# Patient Record
Sex: FEMALE | Race: WHITE | NOT HISPANIC OR LATINO | Employment: STUDENT | URBAN - METROPOLITAN AREA
[De-identification: names, ages, dates, MRNs, and addresses within clinical notes are randomized per-mention and may not be internally consistent; named-entity substitution may affect disease eponyms.]

---

## 2023-04-24 LAB
LEFT EYE DM RETINOPATHY: NEGATIVE
RIGHT EYE DM RETINOPATHY: NEGATIVE

## 2023-08-17 ENCOUNTER — TELEPHONE (OUTPATIENT)
Dept: DIABETES | Facility: CLINIC | Age: 33
End: 2023-08-17
Payer: COMMERCIAL

## 2023-08-17 NOTE — TELEPHONE ENCOUNTER
Pt called regarding appt scheduling, pt informed that appts are scheduled through referrals and to request one  from pcp. Pt voiced understanding.  ----- Message from Pat Winslow sent at 8/17/2023  1:09 PM CDT -----  Contact: Leni melony [phone]  [email or web address]     Patient is calling for Medical Advice regarding:    How long has patient had these symptoms:    Pharmacy name and phone#:    Would like response via Highlighthart:call back    Comments: Pt is requesting a call back from the nurse to get an appt

## 2023-10-03 ENCOUNTER — PATIENT MESSAGE (OUTPATIENT)
Dept: DIABETES | Facility: CLINIC | Age: 33
End: 2023-10-03
Payer: COMMERCIAL

## 2023-11-28 ENCOUNTER — TELEPHONE (OUTPATIENT)
Dept: DIABETES | Facility: CLINIC | Age: 33
End: 2023-11-28

## 2023-11-28 ENCOUNTER — LAB VISIT (OUTPATIENT)
Dept: LAB | Facility: HOSPITAL | Age: 33
End: 2023-11-28
Attending: NURSE PRACTITIONER
Payer: COMMERCIAL

## 2023-11-28 ENCOUNTER — OFFICE VISIT (OUTPATIENT)
Dept: DIABETES | Facility: CLINIC | Age: 33
End: 2023-11-28
Payer: COMMERCIAL

## 2023-11-28 VITALS
HEIGHT: 64 IN | HEART RATE: 93 BPM | DIASTOLIC BLOOD PRESSURE: 108 MMHG | BODY MASS INDEX: 46.79 KG/M2 | SYSTOLIC BLOOD PRESSURE: 165 MMHG | WEIGHT: 274.06 LBS

## 2023-11-28 DIAGNOSIS — Z79.4 UNCONTROLLED TYPE 2 DIABETES MELLITUS WITH HYPERGLYCEMIA, WITH LONG-TERM CURRENT USE OF INSULIN: ICD-10-CM

## 2023-11-28 DIAGNOSIS — E11.65 UNCONTROLLED TYPE 2 DIABETES MELLITUS WITH HYPERGLYCEMIA, WITH LONG-TERM CURRENT USE OF INSULIN: Primary | ICD-10-CM

## 2023-11-28 DIAGNOSIS — I10 PRIMARY HYPERTENSION: ICD-10-CM

## 2023-11-28 DIAGNOSIS — E11.42 DIABETIC PERIPHERAL NEUROPATHY ASSOCIATED WITH TYPE 2 DIABETES MELLITUS: ICD-10-CM

## 2023-11-28 DIAGNOSIS — N30.10 INTERSTITIAL CYSTITIS: ICD-10-CM

## 2023-11-28 DIAGNOSIS — Z79.4 UNCONTROLLED TYPE 2 DIABETES MELLITUS WITH HYPERGLYCEMIA, WITH LONG-TERM CURRENT USE OF INSULIN: Primary | ICD-10-CM

## 2023-11-28 DIAGNOSIS — R80.9 PERSISTENT MICROALBUMINURIA ASSOCIATED WITH TYPE 2 DIABETES MELLITUS: ICD-10-CM

## 2023-11-28 DIAGNOSIS — E11.65 UNCONTROLLED TYPE 2 DIABETES MELLITUS WITH HYPERGLYCEMIA, WITH LONG-TERM CURRENT USE OF INSULIN: ICD-10-CM

## 2023-11-28 DIAGNOSIS — E11.29 PERSISTENT MICROALBUMINURIA ASSOCIATED WITH TYPE 2 DIABETES MELLITUS: ICD-10-CM

## 2023-11-28 LAB
ANION GAP SERPL CALC-SCNC: 11 MMOL/L (ref 8–16)
BUN SERPL-MCNC: 9 MG/DL (ref 6–20)
C PEPTIDE SERPL-MCNC: 4.97 NG/ML (ref 0.78–5.19)
CALCIUM SERPL-MCNC: 9.9 MG/DL (ref 8.7–10.5)
CHLORIDE SERPL-SCNC: 98 MMOL/L (ref 95–110)
CHOLEST SERPL-MCNC: 247 MG/DL (ref 120–199)
CHOLEST/HDLC SERPL: 6.9 {RATIO} (ref 2–5)
CO2 SERPL-SCNC: 22 MMOL/L (ref 23–29)
CREAT SERPL-MCNC: 0.7 MG/DL (ref 0.5–1.4)
EST. GFR  (NO RACE VARIABLE): >60 ML/MIN/1.73 M^2
ESTIMATED AVG GLUCOSE: 266 MG/DL (ref 68–131)
GLUCOSE SERPL-MCNC: 251 MG/DL (ref 70–110)
GLUCOSE SERPL-MCNC: 253 MG/DL (ref 70–110)
HBA1C MFR BLD: 10.9 % (ref 4–5.6)
HDLC SERPL-MCNC: 36 MG/DL (ref 40–75)
HDLC SERPL: 14.6 % (ref 20–50)
LDLC SERPL CALC-MCNC: 152.2 MG/DL (ref 63–159)
NONHDLC SERPL-MCNC: 211 MG/DL
POTASSIUM SERPL-SCNC: 4.6 MMOL/L (ref 3.5–5.1)
SODIUM SERPL-SCNC: 131 MMOL/L (ref 136–145)
TRIGL SERPL-MCNC: 294 MG/DL (ref 30–150)
TSH SERPL DL<=0.005 MIU/L-ACNC: 1.4 UIU/ML (ref 0.4–4)

## 2023-11-28 PROCEDURE — 3080F DIAST BP >= 90 MM HG: CPT | Mod: CPTII,S$GLB,, | Performed by: NURSE PRACTITIONER

## 2023-11-28 PROCEDURE — 99204 PR OFFICE/OUTPT VISIT, NEW, LEVL IV, 45-59 MIN: ICD-10-PCS | Mod: S$GLB,,, | Performed by: NURSE PRACTITIONER

## 2023-11-28 PROCEDURE — 99999 PR PBB SHADOW E&M-EST. PATIENT-LVL V: CPT | Mod: PBBFAC,,, | Performed by: NURSE PRACTITIONER

## 2023-11-28 PROCEDURE — 86341 ISLET CELL ANTIBODY: CPT | Performed by: NURSE PRACTITIONER

## 2023-11-28 PROCEDURE — 1159F PR MEDICATION LIST DOCUMENTED IN MEDICAL RECORD: ICD-10-PCS | Mod: CPTII,S$GLB,, | Performed by: NURSE PRACTITIONER

## 2023-11-28 PROCEDURE — 99999 PR PBB SHADOW E&M-EST. PATIENT-LVL V: ICD-10-PCS | Mod: PBBFAC,,, | Performed by: NURSE PRACTITIONER

## 2023-11-28 PROCEDURE — 84681 ASSAY OF C-PEPTIDE: CPT | Performed by: NURSE PRACTITIONER

## 2023-11-28 PROCEDURE — 1159F MED LIST DOCD IN RCRD: CPT | Mod: CPTII,S$GLB,, | Performed by: NURSE PRACTITIONER

## 2023-11-28 PROCEDURE — 86337 INSULIN ANTIBODIES: CPT | Performed by: NURSE PRACTITIONER

## 2023-11-28 PROCEDURE — 3046F HEMOGLOBIN A1C LEVEL >9.0%: CPT | Mod: CPTII,S$GLB,, | Performed by: NURSE PRACTITIONER

## 2023-11-28 PROCEDURE — 83036 HEMOGLOBIN GLYCOSYLATED A1C: CPT | Performed by: NURSE PRACTITIONER

## 2023-11-28 PROCEDURE — 3080F PR MOST RECENT DIASTOLIC BLOOD PRESSURE >= 90 MM HG: ICD-10-PCS | Mod: CPTII,S$GLB,, | Performed by: NURSE PRACTITIONER

## 2023-11-28 PROCEDURE — 3077F PR MOST RECENT SYSTOLIC BLOOD PRESSURE >= 140 MM HG: ICD-10-PCS | Mod: CPTII,S$GLB,, | Performed by: NURSE PRACTITIONER

## 2023-11-28 PROCEDURE — 84443 ASSAY THYROID STIM HORMONE: CPT | Performed by: NURSE PRACTITIONER

## 2023-11-28 PROCEDURE — 3077F SYST BP >= 140 MM HG: CPT | Mod: CPTII,S$GLB,, | Performed by: NURSE PRACTITIONER

## 2023-11-28 PROCEDURE — 3008F BODY MASS INDEX DOCD: CPT | Mod: CPTII,S$GLB,, | Performed by: NURSE PRACTITIONER

## 2023-11-28 PROCEDURE — 82962 POCT GLUCOSE, HAND-HELD DEVICE: ICD-10-PCS | Mod: S$GLB,,, | Performed by: NURSE PRACTITIONER

## 2023-11-28 PROCEDURE — 82043 UR ALBUMIN QUANTITATIVE: CPT | Performed by: NURSE PRACTITIONER

## 2023-11-28 PROCEDURE — 3046F PR MOST RECENT HEMOGLOBIN A1C LEVEL > 9.0%: ICD-10-PCS | Mod: CPTII,S$GLB,, | Performed by: NURSE PRACTITIONER

## 2023-11-28 PROCEDURE — 80061 LIPID PANEL: CPT | Performed by: NURSE PRACTITIONER

## 2023-11-28 PROCEDURE — 1160F PR REVIEW ALL MEDS BY PRESCRIBER/CLIN PHARMACIST DOCUMENTED: ICD-10-PCS | Mod: CPTII,S$GLB,, | Performed by: NURSE PRACTITIONER

## 2023-11-28 PROCEDURE — 80048 BASIC METABOLIC PNL TOTAL CA: CPT | Performed by: NURSE PRACTITIONER

## 2023-11-28 PROCEDURE — 99204 OFFICE O/P NEW MOD 45 MIN: CPT | Mod: S$GLB,,, | Performed by: NURSE PRACTITIONER

## 2023-11-28 PROCEDURE — 36415 COLL VENOUS BLD VENIPUNCTURE: CPT | Performed by: NURSE PRACTITIONER

## 2023-11-28 PROCEDURE — 82962 GLUCOSE BLOOD TEST: CPT | Mod: S$GLB,,, | Performed by: NURSE PRACTITIONER

## 2023-11-28 PROCEDURE — 3008F PR BODY MASS INDEX (BMI) DOCUMENTED: ICD-10-PCS | Mod: CPTII,S$GLB,, | Performed by: NURSE PRACTITIONER

## 2023-11-28 PROCEDURE — 1160F RVW MEDS BY RX/DR IN RCRD: CPT | Mod: CPTII,S$GLB,, | Performed by: NURSE PRACTITIONER

## 2023-11-28 RX ORDER — BLOOD-GLUCOSE SENSOR
EACH MISCELLANEOUS
Qty: 2 EACH | Refills: 11 | Status: SHIPPED | OUTPATIENT
Start: 2023-11-28

## 2023-11-28 RX ORDER — TIRZEPATIDE 2.5 MG/.5ML
2.5 INJECTION, SOLUTION SUBCUTANEOUS
Qty: 4 PEN | Refills: 0 | Status: SHIPPED | OUTPATIENT
Start: 2023-11-28 | End: 2023-12-15 | Stop reason: SINTOL

## 2023-11-28 RX ORDER — INSULIN DEGLUDEC 200 U/ML
100 INJECTION, SOLUTION SUBCUTANEOUS DAILY
Qty: 6 PEN | Refills: 5 | Status: SHIPPED | OUTPATIENT
Start: 2023-11-28 | End: 2024-01-09 | Stop reason: SDUPTHER

## 2023-11-28 RX ORDER — INSULIN ASPART 100 [IU]/ML
50 INJECTION, SOLUTION INTRAVENOUS; SUBCUTANEOUS
Qty: 45 ML | Refills: 5 | Status: SHIPPED | OUTPATIENT
Start: 2023-11-28

## 2023-11-28 NOTE — Clinical Note
Can you try to obtain her eye exam for me? [facility]. Thanks! 06-Nov-2020 18:25

## 2023-11-28 NOTE — PATIENT INSTRUCTIONS
Referrals: [facility] 3 and diabetes education. Also need pre-pump assessment. Consideration for the iLet insulin pump.    Stop Basaglar twice daily.  Start Tresiba 110 units ONCE daily.     Continue Novolog 50 units before each main meal for now.     Start Mounjaro 2.5 mg once a week. Main side effects = appetite suppression/weight loss, possibly nausea, constipation/diarrhea or reflux. If the symptoms are mild, they do improve with continued use. If you develop any abdominal pain or you are not tolerating this, stop the medication and let me know.

## 2023-11-28 NOTE — PROGRESS NOTES
Subjective:         Patient ID: Leni Dumont is a 33 y.o. female.  Patient's current PCP is Brian Darby MD.     Chief Complaint: Diabetes Mellitus    HPI  Leni Dumont is a 33 y.o. White female presenting for a new consult for diabetes. Patient has been diagnosed with diabetes since age 17 years old .  The patient was initially diagnosed with Type 2 diabetes mellitus based on the following criteria:   Received diabetes education:    CURRENT DM MEDICATIONS:   Basaglar 50 units twice a day   Novolog 50 units TID AC -- out x 2 weeks     Past failed treatment include: Metformin - significant diarrhea; Trulicity - diarrhea/bladder pain; Prandin,Byetta,Glipizide- diarrhea. Lantus/Basaglar- failure to control diabetes    Blood glucose testing is performed regularly. Patient is testing 3 times per day, denies hypoglycemia. CGM - No // Interested in Kal  Meter: Did not bring today  Preferred lab: [place]     Her blood sugar in the clinic today was:   Lab Results   Component Value Date    POCGLU 253 (A) 11/28/2023     Leni Dumont presents today to discuss DM management.   Home blood sugar records: None available today    Establishing care - here with her mom. She has a history of interstitial cystitis and reports that healthy foods exacerbate her symptoms -- as a result, she eats mainly meat and carbs. No meter/logs today. She has been out of Novolog x 2 weeks. She is fasting today with glucose of 253 noted. She reports before running out of insulin, she was taking 50 units TID AC. Denies any hypoglycemia. She has tried and failed multiple medications (see above) due to GI side effects. With her current bladder complaints, I do not feel adding an SGLT2 inhibitor would be appropriate. We discussed CGM and she is agreeable to Kal referral. We briefly discussed insulin pump therapy if ultimately insulin is the only medication she tolerates - she may be a good candidate for the iLet pump. Ultimately, U500 may  "need to be considered. She is willing to try Mounjaro but I suspect this may not be tolerated- reviewed MOA and most common side effects. She has no contraindications to this drug class, including no history of pancreatitis, gastroparesis, or personal nor family history of medullary thyroid cancer.    Foot exam deferred due to time constraints. Reports eye exam UTD. Denies DR.     She reports being tested in the past for hypercortisolism with another provider with 24 hour urine studies- states negative. I do not see these records and it may be worth a re-check. She has central weight gain, thin extremities, and a prominent dorsocervical fat pad noted. Will discuss at next visit re-check.    All Bgs > 200. Has seen as high as 500.    Neuropathy- Followed by Dr. Linda.    Urologist- Dr. Roberts.    Current diet:  Mainly carbs and meats. Can eat fresh green beans and carrots. Cannot eat wheat pasta. Eats a lot of chicken, tortillas, cheese. Can only drink Dasani water.    Activity Level:     Lab Results   Component Value Date    HGBA1C 10.5 (H) 04/11/2023    HGBA1C 11.1 (H) 06/29/2022    HGBA1C 11.7 (H) 01/04/2022     Any episodes of hypoglycemia? no   Hypoglycemia Unawareness? no   Severe hypoglycemia requiring 3rd party no  Complications related to diabetes: peripheral neuropathy and microalbuminuria    STANDARDS OF CARE  Diabetes Management Status    Statin: Taking  ACE/ARB: Not taking    Screening or Prevention Patient's value Goal Complete/Controlled?   HgA1C Testing and Control   Lab Results   Component Value Date    HGBA1C 10.5 (H) 04/11/2023      Annually/Less than 8% No   Lipid profile : 06/29/2022 Annually No   LDL control No results found for: "LDLCALC" Annually/Less than 100 mg/dl  No   Nephropathy screening No results found for: "LABMICR"  Lab Results   Component Value Date    PROTEINUA Negative 09/29/2014     Lab Results   Component Value Date    UTPCR 0.05 06/11/2008      Annually No   Blood pressure BP " "Readings from Last 1 Encounters:   11/28/23 (!) 165/108    Less than 140/90 No   Dilated retinal exam Most Recent Eye Exam Date: Not Found Annually Yes- reports UTD with [facility].   Foot exam   Most Recent Foot Exam Date: Not Found Annually No Deferred-time constraints      Labs reviewed and are noted below.    Lab Results   Component Value Date    WBC 11.25 09/29/2014    HGB 13.4 09/29/2014    HCT 41.4 09/29/2014     (H) 09/29/2014    ALT 68 (H) 09/29/2014    AST 54 (H) 09/29/2014     12/11/2020    K 4.6 12/11/2020     09/29/2014    ANIONGAP 10 09/29/2014    CREATININE 0.59 12/11/2020    ESTGFRAFRICA 144 12/11/2020    EGFRNONAA >60 09/29/2014    BUN 11 12/11/2020    CO2 23 09/29/2014    TSH 1.91 04/11/2023     (H) 09/29/2014    UTPCR 0.05 06/11/2008     Lab Results   Component Value Date    TSH 1.91 04/11/2023    XPKMVEZL09 759 06/30/2023    CALCIUM 9.7 12/11/2020    PHOS 4.2 06/11/2008     No results found for: "CPEPTIDE"  No results found for: "GLUTAMICACID"  Glucose   Date Value Ref Range Status   09/29/2014 142 (H) 70 - 110 mg/dL Final     Anion Gap   Date Value Ref Range Status   09/29/2014 10 8 - 16 mmol/L Final     eGFR if    Date Value Ref Range Status   09/29/2014 >60 >60 mL/min/1.73 m^2 Final     eGFR    Date Value Ref Range Status   12/11/2020 144  Final     eGFR if non    Date Value Ref Range Status   09/29/2014 >60 >60 mL/min/1.73 m^2 Final     Comment:     Calculation used to obtain the estimated glomerular filtration  rate (eGFR) is the CKD-EPI equation. Since race is unknown   in our information system, the eGFR values for   -American and Non--American patients are given   for each creatinine result.       The following portions of the patient's history were reviewed and updated as appropriate: allergies, current medications, past family history, past medical history, past social history, past " surgical history, and problem list.    Review of patient's allergies indicates:   Allergen Reactions    Dextromethorphan-guaifenesin     Dicyclomine     Doxycycline     Erythromycin     Exefen [pseudoephedrine-guaifenesin]     Medrol [methylprednisolone]     Phenylephrine      Social History     Socioeconomic History    Marital status: Single   Tobacco Use    Smoking status: Never    Smokeless tobacco: Never   Substance and Sexual Activity    Alcohol use: No    Drug use: No    Sexual activity: Yes     Past Medical History:   Diagnosis Date    Asthma     Diabetes mellitus     Heart disease     Hyperlipidemia     Interstitial cystitis     Varicose veins of left leg with edema        REVIEW OF SYSTEMS:  Eyes:Denies h/o DR.  Cardiovascular: History of HTN and HLD.  GI: No history of pancreatitis or gastroparesis.  : Positive microalbuminuria. H/o interstitial cystitis.   Neuro: Positive neuropathy.  PSYCH: No tobacco use.  ENDO: See HPI.        Objective:      Vitals:    11/28/23 1310   BP: (!) 165/108   Pulse: 93     RESPIRATORY: No respiratory distress  NEUROLOGIC: Cranial nerves II-XII grossly intact.   PSYCHIATRIC: Alert & oriented x3. Normal mood and affect.  FOOT EXAMINATION: Deferred-time constraints  Assessment:       1. Uncontrolled type 2 diabetes mellitus with hyperglycemia, with long-term current use of insulin    2. Primary hypertension    3. Interstitial cystitis    4. Persistent microalbuminuria associated with type 2 diabetes mellitus    5. Diabetic peripheral neuropathy associated with type 2 diabetes mellitus        Plan:   Uncontrolled type 2 diabetes mellitus with hyperglycemia, with long-term current use of insulin  -     POCT Glucose, Hand-Held Device  -     FREESTYLE SWATI 3 SENSOR Jigna; Change sensor every 14 days  Dispense: 2 each; Refill: 11  -     Ambulatory referral/consult to Diabetes Education; Future; Expected date: 11/28/2023  -     Glutamic Acid Decarboxylase; Future; Expected date:  11/28/2023  -     Insulin Antibody; Future; Expected date: 11/28/2023  -     C-Peptide; Future; Expected date: 11/28/2023  -     Basic Metabolic Panel; Future; Expected date: 11/28/2023  -     Hemoglobin A1C; Future; Expected date: 11/28/2023  -     Lipid Panel; Future; Expected date: 11/28/2023  -     Microalbumin/Creatinine Ratio, Urine; Future; Expected date: 11/28/2023  -     TSH; Future; Expected date: 11/28/2023  -     insulin degludec (TRESIBA FLEXTOUCH U-200) 200 unit/mL (3 mL) insulin pen; Inject 100 Units into the skin once daily.  Dispense: 6 pen ; Refill: 5  -     tirzepatide (MOUNJARO) 2.5 mg/0.5 mL PnIj; Inject 2.5 mg into the skin every 7 days.  Dispense: 4 pen ; Refill: 0  -     NOVOLOG FLEXPEN U-100 INSULIN 100 unit/mL (3 mL) InPn pen; Inject 50 Units into the skin 3 (three) times daily with meals.  Dispense: 45 mL; Refill: 5    Referrals: [facility] 3 and diabetes education. Also need pre-pump assessment. Consideration for the iLet insulin pump. May ultimately need U500 if Mounjaro is not tolerated.     Stop Basaglar twice daily.  Start Tresiba 110 units ONCE daily.     Continue Novolog 50 units before each main meal for now.     Start Mounjaro 2.5 mg once a week. Main side effects = appetite suppression/weight loss, possibly nausea, constipation/diarrhea or reflux. If the symptoms are mild, they do improve with continued use. If you develop any abdominal pain or you are not tolerating this, stop the medication and let me know.    Primary hypertension    Interstitial cystitis    Persistent microalbuminuria associated with type 2 diabetes mellitus    Diabetic peripheral neuropathy associated with type 2 diabetes mellitus    - Follow up: 6 weeks    I spent a total of 50 minutes on the day of the visit.This includes face to face time and non-face to face time preparing to see the patient (eg, review of tests), documenting clinical information in the electronic record, independently interpreting results and  "communicating results to the patient.    Portions of this note may have been created with voice recognition software. Occasional "wrong-word" or "sound-a-like" substitutions may have occurred due to the inherent limitations of voice recognition software. Please, read the note carefully and recognize, using context, where substitutions have occurred.           Angelina Clemente,EVELINP-C, BC-ADM [facility]    "

## 2023-11-28 NOTE — LETTER
AUTHORIZATION FOR RELEASE OF   CONFIDENTIAL INFORMATION        We are seeing Leni Dumont, date of birth 1990, in the clinic at No Department Specified. Brian Darby MD is the patient's PCP. Leni Dumont has an outstanding lab/procedure at the time we reviewed her chart. In order to help keep her health information updated, she has authorized us to request the following medical record(s):        (  )  MAMMOGRAM                                      (  )  COLONOSCOPY      (  )  PAP SMEAR                                          (  )  OUTSIDE LAB RESULTS     (  )  DEXA SCAN                                          ( X )  DIABETIC EYE EXAM            (  )  FOOT EXAM                                          (  )  ENTIRE RECORD     (  )  OUTSIDE IMMUNIZATIONS                 (  )  _______________         Please fax records to [facility], [place],-[phone]     If you have any questions, please contact Lakeshia Garza           Patient Name: Leni Dumont  : 1990  Patient Phone #: [phone]

## 2023-11-29 ENCOUNTER — TELEPHONE (OUTPATIENT)
Dept: DIABETES | Facility: CLINIC | Age: 33
End: 2023-11-29
Payer: COMMERCIAL

## 2023-11-29 NOTE — TELEPHONE ENCOUNTER
Spoke with pharmacist regarding pt novolog, stated instructions put by provider is a big increase from last one. Pharmacist informed of instructions per provider for medication , pharmacist voiced understanding.  ----- Message from Yuli Mcdermott sent at 11/29/2023 12:49 PM CST -----  Regarding: Pt Advice  Contact: Jaylene/[facility] [phone]  Jaylene/[facility] Pharmacist calling regarding dosage on  NOVOLOG FLEXPEN U-100 INSULIN 100 unit/mL (3 mL) InPn pen 45 mL. Please call [phone]

## 2023-11-30 ENCOUNTER — PATIENT MESSAGE (OUTPATIENT)
Dept: DIABETES | Facility: CLINIC | Age: 33
End: 2023-11-30
Payer: COMMERCIAL

## 2023-11-30 ENCOUNTER — TELEPHONE (OUTPATIENT)
Dept: DIABETES | Facility: CLINIC | Age: 33
End: 2023-11-30
Payer: COMMERCIAL

## 2023-11-30 DIAGNOSIS — E11.69 HYPERLIPIDEMIA ASSOCIATED WITH TYPE 2 DIABETES MELLITUS: Primary | ICD-10-CM

## 2023-11-30 DIAGNOSIS — E78.5 HYPERLIPIDEMIA ASSOCIATED WITH TYPE 2 DIABETES MELLITUS: Primary | ICD-10-CM

## 2023-11-30 RX ORDER — ROSUVASTATIN CALCIUM 10 MG/1
10 TABLET, COATED ORAL DAILY
Qty: 30 TABLET | Refills: 5 | Status: SHIPPED | OUTPATIENT
Start: 2023-11-30

## 2023-11-30 NOTE — TELEPHONE ENCOUNTER
Pt was confirmed lab results verbally understand  ----- Message from Angelina Clemente NP sent at 11/30/2023  9:40 AM CST -----  A1c well above goal at 10.9%. Hoping the Mounjaro will help with her diabetes control. C-peptide is normal, confirming optimal amount of insulin production. Glucose high at 251.  Kidney function and thyroid level are normal. Cholesterol is high. Is she still taking Zocor or has she tried anything else for high cholesterol?

## 2023-11-30 NOTE — PROGRESS NOTES
A1c well above goal at 10.9%. Hoping the Mounjaro will help with her diabetes control. C-peptide is normal, confirming optimal amount of insulin production. Glucose high at 251.  Kidney function and thyroid level are normal. Cholesterol is high. Is she still taking Zocor or has she tried anything else for high cholesterol?

## 2023-12-01 LAB — GAD65 AB SER-SCNC: 0 NMOL/L

## 2023-12-02 LAB — INSULIN AB SER-SCNC: 0 NMOL/L (ref 0–0.02)

## 2023-12-06 ENCOUNTER — CLINICAL SUPPORT (OUTPATIENT)
Dept: DIABETES | Facility: CLINIC | Age: 33
End: 2023-12-06
Payer: COMMERCIAL

## 2023-12-06 ENCOUNTER — TELEPHONE (OUTPATIENT)
Dept: DIABETES | Facility: CLINIC | Age: 33
End: 2023-12-06
Payer: COMMERCIAL

## 2023-12-06 DIAGNOSIS — Z79.4 UNCONTROLLED TYPE 2 DIABETES MELLITUS WITH HYPERGLYCEMIA, WITH LONG-TERM CURRENT USE OF INSULIN: ICD-10-CM

## 2023-12-06 DIAGNOSIS — E11.65 UNCONTROLLED TYPE 2 DIABETES MELLITUS WITH HYPERGLYCEMIA, WITH LONG-TERM CURRENT USE OF INSULIN: Primary | ICD-10-CM

## 2023-12-06 DIAGNOSIS — E11.65 UNCONTROLLED TYPE 2 DIABETES MELLITUS WITH HYPERGLYCEMIA, WITH LONG-TERM CURRENT USE OF INSULIN: ICD-10-CM

## 2023-12-06 DIAGNOSIS — Z79.4 UNCONTROLLED TYPE 2 DIABETES MELLITUS WITH HYPERGLYCEMIA, WITH LONG-TERM CURRENT USE OF INSULIN: Primary | ICD-10-CM

## 2023-12-06 LAB
ALBUMIN/CREAT UR: 354.4 UG/MG (ref 0–30)
CREAT UR-MCNC: 68 MG/DL (ref 15–325)
MICROALBUMIN UR DL<=1MG/L-MCNC: 241 UG/ML

## 2023-12-06 PROCEDURE — G0108 DIAB MANAGE TRN  PER INDIV: HCPCS | Mod: S$GLB,,, | Performed by: DIETITIAN, REGISTERED

## 2023-12-06 PROCEDURE — G0108 PR DIAB MANAGE TRN  PER INDIV: ICD-10-PCS | Mod: S$GLB,,, | Performed by: DIETITIAN, REGISTERED

## 2023-12-06 PROCEDURE — 99999 PR PBB SHADOW E&M-EST. PATIENT-LVL III: ICD-10-PCS | Mod: PBBFAC,,, | Performed by: DIETITIAN, REGISTERED

## 2023-12-06 PROCEDURE — 99999 PR PBB SHADOW E&M-EST. PATIENT-LVL III: CPT | Mod: PBBFAC,,, | Performed by: DIETITIAN, REGISTERED

## 2023-12-07 ENCOUNTER — TELEPHONE (OUTPATIENT)
Dept: DIABETES | Facility: CLINIC | Age: 33
End: 2023-12-07
Payer: COMMERCIAL

## 2023-12-07 ENCOUNTER — PATIENT MESSAGE (OUTPATIENT)
Dept: DIABETES | Facility: CLINIC | Age: 33
End: 2023-12-07
Payer: COMMERCIAL

## 2023-12-07 VITALS — WEIGHT: 278 LBS | BODY MASS INDEX: 47.72 KG/M2

## 2023-12-07 DIAGNOSIS — E11.65 UNCONTROLLED TYPE 2 DIABETES MELLITUS WITH HYPERGLYCEMIA, WITH LONG-TERM CURRENT USE OF INSULIN: Primary | ICD-10-CM

## 2023-12-07 DIAGNOSIS — Z79.4 UNCONTROLLED TYPE 2 DIABETES MELLITUS WITH HYPERGLYCEMIA, WITH LONG-TERM CURRENT USE OF INSULIN: Primary | ICD-10-CM

## 2023-12-07 NOTE — PROGRESS NOTES
12/14/23 - Dexcom report reviewed, saved media.    Diabetes Care Specialist Progress Note  Author: Maricruz Beckford RD, CDE  Date: 12/7/2023    Program Intake  Reason for Diabetes Program Visit:: Initial Diabetes Assessment (DM referral 11/25/23 Angelina Clemente, NP)  Type of Intervention:: Individual  Education: Self-Management Skill Review  Device Training: Personal CGM (Kal 3 training)  Current diabetes risk level:: moderate  In the last 12 months, have you:: none    Lab Results   Component Value Date    HGBA1C 10.9 (H) 11/28/2023     Clinical    Weight: 126.1 kg (278 lb)       Body mass index is 47.72 kg/m².    Patient Health Rating  Compared to other people your age, how would you rate your health?: Poor    Problem Review  Active comorbidities affecting diabetes self-care.: yes (HTN, HLD, Neuropathy, Microalbuminuria, Heart disease, Interstitial cystitis; noted diabetes since 16yo.)    Clinical Assessment  Current Diabetes Treatment:  (Mounjaro 2.5 mg weekly. Tresiba - pt dosing 100units daily. Novolog ac 50units. Noted provider Reggie considering iLet pump.)  Have you ever experienced hypoglycemia (low blood sugar)?: yes  In the last month, how often have you experienced low blood sugar?: more than once a day  Are you able to tell when your blood sugar is low?: Yes  What symptoms do you experience?: Dizzy/Light-headed, Shaky (blurred vision)  Have you ever been hospitalized because your blood sugar was too low?: no  How do you treat hypoglycemia (low blood sugar)?: other (eggnog)  Have you ever experienced hyperglycemia (high blood sugar)?: yes  In the last month, how often have you experienced high blood sugar?:  (improving since started Tresiba)  Are you able to tell when your blood sugar is high?: Yes  What are your symptoms?: frequent urination  Have you ever been hospitalized because your blood sugar was high?: no    Medication Information  How many days a week do you miss your medications?:  Never  Do you sometimes have difficulty refilling your medications?: No  Medication adherence impacting ability to self-manage diabetes?: No    Labs  Do you have regular lab work to monitor your medications?: Yes  Type of Regular Lab Work: A1c, Cholesterol (11/28/23 14:14  C-Peptide: 4.97)  Where do you get your labs drawn?: [facility]  Lab Compliance Barriers: No    Nutritional Status  Diet: Regular (Pt reports 2-3 meals w/ snacks daily. Family limits fried foods. Sugary snacks used in small portions.)  Change in appetite?: No  Recent Changes in Weight: No Recent Weight Change  Current nutritional status an area of need that is impacting patient's ability to self-manage diabetes?: No    Additional Social History    Support  Does anyone support you with your diabetes care?: yes  Who supports you?: parent, family member, self  Is Support an area impacting ability to self-manage diabetes?: No    Access to Mass Media & Technology  Does the patient have access to any of the following devices or technologies?: Smart phone, Internet Access  Media or technology needs impacting ability to self-manage diabetes?: No    Cognitive/Behavioral Health  Alert and Oriented: Yes  Difficulty Thinking: No  Requires Prompting: No  Requires assistance for routine expression?: No  Cognitive or behavioral barriers impacting ability to self-manage diabetes?: No    Culture/Rastafari  Culture or Baptist beliefs that may impact ability to access healthcare: No    Communication  Language preference: English  Hearing Problems: No  Vision Problems: Yes  Vision problem type:: Decreased Vision  Vision Assistance: Glasses  Communication needs impacting ability to self-manage diabetes?: No    Health Literacy  Preferred Learning Method: Demonstration, Hands On, Video  How often do you need to have someone help you read instructions, pamphlets, or written material from your doctor or pharmacy?: Never  Health literacy needs impacting ability to  self-manage diabetes?: No      Diabetes Self-Management Skills Assessment    Diabetes Disease Process/Treatment Options  Diabetes Disease Process/Treatment Options: Skills Assessment Completed: No  Deferred due to:: Time  Area of need?: Deferred    Nutrition/Healthy Eating  Challenges to healthy eating::  (Limited food tolerance due interstitial cystitis.)  Method of carbohydrate measurement:: eyeballing/guessing  Patient can identify foods that impact blood sugar.: yes  Patient-identified foods:: starches (bread, pasta, rice, cereal), sweets  Nutrition/Healthy Eating Skills Assessment Completed:: Yes  Assessment indicates:: Knowledge deficit, Instruction Needed  Area of need?: Yes    Physical Activity/Exercise  Physical Activity/Exercise Skills Assessment Completed: : No  Deffered due to:: Time  Area of need?: Deferred    Medications  Patient is able to describe current diabetes management routine.: yes  Diabetes management routine:: insulin, injectable medications  Patient is able to identify current diabetes medications, dosages, and appropriate timing of medications.: yes  Patient understands the purpose of the medications taken for diabetes.: yes  Patient reports problems or concerns with current medication regimen.: no  Medication Skills Assessment Completed:: Yes  Assessment indicates:: Adequate understanding  Area of need?: No    Home Blood Glucose Monitoring  Patient states that blood sugar is checked at home daily.: yes  Monitoring Method:: home glucometer, personal continuous glucose monitor  Home glucometer meter type:: unavailable  When you check what is your typical blood sugar range? : per recall, fst -180, ac 130-170, bed 104-180  Blood glucose logs:: no, encouraged to bring logs to provider visits  Personal CGM type:: Pt [facility] for Kal 3 training.  Home Blood Glucose Monitoring Skills Assessment Completed: : Yes  Assessment indicates:: Knowledge deficit, Instruction Needed  Area of  need?: Yes    Acute Complications  Patient is able to identify types of acute complications: Yes  Patient Identified:: Hypoglycemia, Hyperglycemia  Patient is able to state the basic meaning of hypoglycemia?: Yes  Able to state the blood sugar range for hypoglycemia?: yes  Patient stated range:: Pt reports symptoms <100.  Patient can identify general symptoms of hypoglycemia: yes  Patient identified:: shakiness, dizziness  Patient identified:: other (see comments) (recently using eggnog)  Patient is able to state the basic meaning of hyperglycemia?: Yes  Able to state the blood sugar range for hyperglycemia?: yes  Patient stated range:: 200s  Patient able to state proper treatment of hyperglycemia?: yes  Patient identified:: monitor blood sugar, take medication as recommended, increase water intake  Acute Complications Skills Assessment Completed: : Yes  Assessment indicates:: Instruction Needed  Area of need?: Yes    Chronic Complications  Chronic Complications Skills Assessment Completed: : No  Deferred due to:: Time  Area of need?: Deferred    Psychosocial/Coping  Patient can identify ways of coping with chronic disease.: yes  Patient-stated ways of coping with chronic disease:: spiritual/Anglican practices, support from loved ones (crafts, cooking)  Psychosocial/Coping Skills Assessment Completed: : Yes  Assessment indicates:: Adequate understanding  Area of need?: No    Assessment Summary and Plan  Based on today's diabetes care assessment, the following areas of need were identified:          12/6/2023    12:01 AM   Social   Support No   Access to Mass Media/Tech No   Cognitive/Behavioral Health No   Culture/Oriental orthodox No   Communication No   Health Literacy No          12/6/2023    12:01 AM   Clinical   Medication Adherence No   Lab Compliance No   Nutritional Status No          12/6/2023    12:01 AM   Diabetes Self-Management Skills   Diabetes Disease Process/Treatment Options Deferred   Nutrition/Healthy  Eating Yes - see care plan   Physical Activity/Exercise Deferred   Medication No   Home Blood Glucose Monitoring Yes - see care plan   Acute Complications Yes - see care plan   Discussed prevention, identification signs/symptoms and treatment of hyperglycemia and hypoglycemia and when to contact clinic.    Chronic Complications Deferred   Psychosocial/Coping No         Today's interventions were provided through individual discussion, instruction, and written materials were provided.    Patient verbalized understanding of instruction and written materials.  Pt was able to return back demonstration of instructions today. Patient understood key points, needs reinforcement and further instruction.     Diabetes Self-Management Care Plan:  Today's Diabetes Self-Management Care Plan was developed with Leni's input. Leni has agreed to work toward the following goal(s) to improve his/her overall diabetes control.      Care Plan: Diabetes Management   Updates made since 11/7/2023 12:00 AM        Problem: Blood Glucose Self-Monitoring         Goal: Patient agrees to use Freestyle Kal 3 and backup meter as directed.    Start Date: 12/6/2023   Expected End Date: 11/25/2024   Priority: High   Barriers: No Barriers Identified   Note:    FREESTYLE KAL 3 CGM TRAINING  Education provided per [employer] Freestyle Kal 3 protocol, quick start guide and videos. Assisted with mobile earl download and [facility] clinic data share.       The FreeStyle Kal 3 Continuous Glucose Monitoring System is a real time continuous glucose monitoring (CGM) device with alarms capability indicated for the management of diabetes in persons age 4 and older.   Use your blood glucose meter to make diabetes treatment decisions when you see the symbol during the first 12 hours of wearing a Sensor, if your Sensor glucose reading does not match how you feel, or if the reading does not include a number.  For you to receive alarms, they must be on and your  [place] should be within 33 feet of you at all times.   To prevent missed alarms, make sure the [place] has sufficient charge and that sound and/or vibration are turned on. Do not force close reader kelvin.   Remove the Sensor before MRI, CT scan, X-ray, or diathermy treatment.    Alarms:   Urgent Low Glucose below 55: default ON   Low Glucose: 100 mg/dl - pt selected   High Glucose 220 mg/dl  Signal Loss: ON  Reminders: Pt selected to add meal time reminders     Only apply the Sensor to the back of the upper arm. If placed in other areas, the Sensor may not function properly. Avoid areas with scars, moles, stretch marks, or lumps. Select an area of skin that generally stays flat during normal daily activities (no bending or folding). Choose a site that is at least 1 inch away from an insulin injection site. Rotate sensor sites.   Sensor can be worn for up to 14 days. The Sensor is water-resistant in up to 3 feet (1 meter) of water. Do not immerse longer than 30 minutes.  Scan sensor to start sensor session. Sensor warm-up 60min and then automatically sends a new glucose reading to your Kelvin every minute.  Instructed on how to understand SG graph, trend arrows. Reviewed menu options. Discussed how to remove and discard sensor.   Differences in glucose readings between interstitial fluid and capillary blood may be observed during times of rapid change in blood glucose, such as after eating, dosing insulin, or exercising.  Taking ascorbic acid (vitamin C) supplements while wearing the Sensor may falsely raise Sensor glucose readings. Taking more than 500 mg of ascorbic acid per day may affect the Sensor readings which could cause you to miss a severe low glucose event. Ascorbic acid can be found in supplements including multivitamins. Some supplements, including cold remedies such as Airborne® and Emergen-C®, may contain high doses of 1000 mg of ascorbic acid and should not be taken while using the Sensor. See your health  care professional to understand how long ascorbic acid is active in your body.  Contact Customer Service if you receive a Replace Sensor message before the end of the 14 day wear period. Customer Service is available at [phone] 7 Days a Week from 8AM to 8PM Eastern Standard Time.    Pt verbalized understanding of all instruction and able to demonstrate sensor application technique per protocol.        Problem: Medications         Goal: Patient Agrees to take Diabetes Medication(s) as prescribed.    Start Date: 12/6/2023   Expected End Date: 11/25/2024   Priority: Low   Barriers: No Barriers Identified        Task: Reviewed with patient all current diabetes medications and provided basic review of the purpose, dosage, frequency, side effects, and storage of both oral and injectable diabetes medications. Completed 12/7/2023        Task: Discussed guidelines for preventing, detecting and treating hypoglycemia and hyperglycemia and reviewed the importance of meal and medication timing with diabetes mediations for prevention of hypoglycemia and maximum drug benefit. Completed 12/7/2023        Problem: Healthy Eating         Goal: Eat 3 meals daily - manage carb 30-45grams/meal, 5-15grams/snack.    Start Date: 12/6/2023   Expected End Date: 11/25/2024   Priority: Medium   Barriers: No Barriers Identified        Task: Reviewed the sources and role of Carbohydrate, Protein, and Fat and how each nutrient impacts blood sugar. Completed 12/7/2023        Task: Review the importance of balancing carbohydrates with each meal using portion control techniques to count servings of carbohydrate and label reading to identify serving size and amount of total carbs per serving. Completed 12/7/2023        Task: Provided Sample plate method and reviewed the use of the plate to estimate amounts of carbohydrate per meal. Completed 12/7/2023          Follow Up Plan   Follow up in about 2 weeks (around 12/20/2023).  -review albaro  reports  -eval goals    Today's care plan and follow up schedule was discussed with patient.  Leni verbalized understanding of the care plan, goals, and agrees to follow up plan.        The patient was encouraged to communicate with his/her health care provider/physician and care team regarding his/her condition(s) and treatment.  I provided the patient with my contact information today and encouraged to contact me via phone or [facility] Patient Portal as needed.     Length of Visit   Total Time: 90 Minutes

## 2023-12-11 ENCOUNTER — TELEPHONE (OUTPATIENT)
Dept: DIABETES | Facility: CLINIC | Age: 33
End: 2023-12-11
Payer: COMMERCIAL

## 2023-12-15 RX ORDER — SEMAGLUTIDE 0.68 MG/ML
0.25 INJECTION, SOLUTION SUBCUTANEOUS
Qty: 3 ML | Refills: 5 | Status: SHIPPED | OUTPATIENT
Start: 2023-12-15 | End: 2023-12-27 | Stop reason: SINTOL

## 2023-12-16 ENCOUNTER — PATIENT MESSAGE (OUTPATIENT)
Dept: DIABETES | Facility: CLINIC | Age: 33
End: 2023-12-16
Payer: COMMERCIAL

## 2023-12-17 ENCOUNTER — PATIENT MESSAGE (OUTPATIENT)
Dept: DIABETES | Facility: CLINIC | Age: 33
End: 2023-12-17
Payer: COMMERCIAL

## 2023-12-18 ENCOUNTER — PATIENT MESSAGE (OUTPATIENT)
Dept: DIABETES | Facility: CLINIC | Age: 33
End: 2023-12-18
Payer: COMMERCIAL

## 2023-12-19 ENCOUNTER — CLINICAL SUPPORT (OUTPATIENT)
Dept: DIABETES | Facility: CLINIC | Age: 33
End: 2023-12-19
Payer: COMMERCIAL

## 2023-12-19 DIAGNOSIS — E11.65 UNCONTROLLED TYPE 2 DIABETES MELLITUS WITH HYPERGLYCEMIA, WITH LONG-TERM CURRENT USE OF INSULIN: Primary | ICD-10-CM

## 2023-12-19 DIAGNOSIS — Z79.4 UNCONTROLLED TYPE 2 DIABETES MELLITUS WITH HYPERGLYCEMIA, WITH LONG-TERM CURRENT USE OF INSULIN: Primary | ICD-10-CM

## 2023-12-19 PROCEDURE — G0108 DIAB MANAGE TRN  PER INDIV: HCPCS | Mod: 95,,, | Performed by: DIETITIAN, REGISTERED

## 2023-12-19 PROCEDURE — G0108 PR DIAB MANAGE TRN  PER INDIV: ICD-10-PCS | Mod: 95,,, | Performed by: DIETITIAN, REGISTERED

## 2023-12-19 NOTE — PROGRESS NOTES
Diabetes Care Specialist Virtual Visit Note       The patient location is: home in [place]  The chief complaint leading to consultation is: Diabetes  Visit type: audiovisual  Total time spent with patient: 30 min   Each patient to whom he or she provides medical services by telemedicine is:  (1) informed of the relationship between the physician and patient and the respective role of any other health care provider with respect to management of the patient; and (2) notified that he or she may decline to receive medical services by telemedicine and may withdraw from such care at any time.      Diabetes Care Specialist Follow-up Note  Author: Maricruz Beckford RD, CDE  Date: 12/19/2023    Program Intake  Reason for Diabetes Program Visit:: Intervention (DM referral 11/25/23 Angelina Clemente NP)  Type of Intervention:: Individual  Education: Self-Management Skill Review  Device Training: Personal CGM (Kal 3 training follow-up)  Current diabetes risk level:: moderate  In the last 12 months, have you:: used emergency room services  Was the ER or hospital admission related to diabetes?: No    Lab Results   Component Value Date    HGBA1C 10.9 (H) 11/28/2023     Clinical  Problem Review  Active comorbidities affecting diabetes self-care.:  (HTN, HLD, Neuropathy, Microalbuminuria, Heart disease, Interstitial cystitis; noted diabetes since 16yo.)    Clinical Assessment  Current Diabetes Treatment:  (Mounjaro 2.5 mg weekly -noted pt switching to Ozempic. Tresiba - pt dosing 100units daily. Novolog ac 50-55units. Noted provider Reggie considering iLet pump.)  Have you ever experienced hypoglycemia (low blood sugar)?: yes  In the last month, how often have you experienced low blood sugar?: more than once a week (pt reports symptoms w/ BG 80s)  Are you able to tell when your blood sugar is low?: Yes (Kal alert)  What symptoms do you experience?: Dizzy/Light-headed, Shaky  How do you treat hypoglycemia (low blood sugar)?: 5-6  pieces of hard candy, 1/2 can soda/fruit juice  Have you ever experienced hyperglycemia (high blood sugar)?: no (pt denies symptoms)    Medication Information  How many days a week do you miss your medications?: Never  Do you sometimes have difficulty refilling your medications?: No  Medication adherence impacting ability to self-manage diabetes?: No    Nutritional Status  Meal Plan 24 Hour Recall - Breakfast: none  Meal Plan 24 Hour Recall - Lunch: Eve mac/cheese, few crackers, water 5 OR chix nuggets  Meal Plan 24 Hour Recall - Dinner: Cheese slices on 12 crackers, wheat pasta 2/3 c w/ margarine, water  Change in appetite?:  (decreased due recent UTI)  Current nutritional status an area of need that is impacting patient's ability to self-manage diabetes?: Yes    SMBG:        Diabetes Self-Management Skills Assessment     Diabetes Disease Process/Treatment Options  Diabetes Disease Process/Treatment Options: Skills Assessment Completed: No  Deferred due to:: Time  Area of need?: Deferred    Physical Activity/Exercise  Physical Activity/Exercise Skills Assessment Completed: : No  Deffered due to:: Time  Area of need?: Deferred    Chronic Complications  Chronic Complications Skills Assessment Completed: : No  Deferred due to:: Time  Area of need?: Deferred    During today's follow-up visit,  the following areas required further assessment and content was provided/reviewed.  Based on today's diabetes care assessment, the following areas of need were identified:        12/19/2023    12:01 AM   Clinical   Medication Adherence No   Nutritional Status Yes -see care plan          12/19/2023    12:01 AM   Diabetes Self-Management Skills   Diabetes Disease Process/Treatment Options Deferred   Physical Activity/Exercise Deferred   Chronic Complications Deferred      Today's interventions were provided through individual discussion, instruction, and written materials were provided.    Patient verbalized understanding of  instruction and written materials.  Pt was able to return back demonstration of instructions today. Patient understood key points, needs reinforcement and further instruction.     Diabetes Self-Management Care Plan Review and Evaluation of Progress:  During today's follow-up Leni's Diabetes Self-Management Care Plan progress was reviewed and progress was evaluated including his/her input. Leni has agreed to continue his/her journey to improve/maintain overall diabetes control by continuing to set health goals. See care plan progress below.      Care Plan: Diabetes Management   Updates made since 11/19/2023 12:00 AM        Problem: Blood Glucose Self-Monitoring         Goal: Patient agrees to use Freestyle Kal 3 and backup meter as directed.    Start Date: 12/6/2023   Expected End Date: 11/25/2024   This Visit's Progress: On track   Priority: High   Barriers: No Barriers Identified   Note:    Reviewed reports w/ pt and encouraged overall progress. Discussed tips to assist adhesion including Tegaderm and Skintac. Reviewed process of reporting failed sensors to Freestyle.      Problem: Medications         Goal: Patient Agrees to take Diabetes Medication(s) as prescribed.    Start Date: 12/6/2023   Expected End Date: 11/25/2024   This Visit's Progress: On track   Priority: Low   Barriers: No Barriers Identified   Note:    Encouraged fu w/ provider Reggie for medical mgmt. Noted from provider mychart message notes, pt was to lower bolus 30units; however, pt's dosing 50-55units since BG readings recently elevated.      Problem: Healthy Eating         Goal: Eat 3 meals daily - manage carb 30-45grams/meal, 5-15grams/snack.    Start Date: 12/6/2023   Expected End Date: 11/25/2024   This Visit's Progress: On track   Priority: Medium   Barriers: No Barriers Identified   Note:    Encouraged maintaining consistent amount carbs per meal and carb/pro balance to help prevent hypoglycemia. Discussed role, application   shake w/ brands to try. Encouraged adequate hydration.        Follow Up Plan   Follow up in about 2 months (around 2/19/2024).  -review Kal report  -eval goals    Today's care plan and follow up schedule was discussed with patient.  Leni verbalized understanding of the care plan, goals, and agrees to follow up plan.        The patient was encouraged to communicate with his/her health care provider/physician and care team regarding his/her condition(s) and treatment.  I provided the patient with my contact information today and encouraged to contact me via phone or [facility]'s Patient Portal as needed.     Length of Visit   Total Time: 30 Minutes

## 2023-12-27 ENCOUNTER — PATIENT MESSAGE (OUTPATIENT)
Dept: DIABETES | Facility: CLINIC | Age: 33
End: 2023-12-27
Payer: COMMERCIAL

## 2023-12-27 DIAGNOSIS — E11.65 UNCONTROLLED TYPE 2 DIABETES MELLITUS WITH HYPERGLYCEMIA, WITH LONG-TERM CURRENT USE OF INSULIN: Primary | ICD-10-CM

## 2023-12-27 DIAGNOSIS — Z79.4 UNCONTROLLED TYPE 2 DIABETES MELLITUS WITH HYPERGLYCEMIA, WITH LONG-TERM CURRENT USE OF INSULIN: Primary | ICD-10-CM

## 2023-12-27 DIAGNOSIS — R11.0 NAUSEA: ICD-10-CM

## 2023-12-27 RX ORDER — TIRZEPATIDE 2.5 MG/.5ML
2.5 INJECTION, SOLUTION SUBCUTANEOUS
Qty: 4 PEN | Refills: 0 | Status: SHIPPED | OUTPATIENT
Start: 2023-12-27 | End: 2024-01-09 | Stop reason: SDUPTHER

## 2023-12-27 RX ORDER — ONDANSETRON 4 MG/1
4 TABLET, ORALLY DISINTEGRATING ORAL EVERY 8 HOURS PRN
Qty: 30 TABLET | Refills: 0 | Status: SHIPPED | OUTPATIENT
Start: 2023-12-27 | End: 2024-01-09 | Stop reason: SDUPTHER

## 2024-01-09 ENCOUNTER — OFFICE VISIT (OUTPATIENT)
Dept: DIABETES | Facility: CLINIC | Age: 34
End: 2024-01-09
Payer: COMMERCIAL

## 2024-01-09 VITALS
SYSTOLIC BLOOD PRESSURE: 142 MMHG | BODY MASS INDEX: 47.24 KG/M2 | HEART RATE: 88 BPM | DIASTOLIC BLOOD PRESSURE: 100 MMHG | HEIGHT: 64 IN | WEIGHT: 276.69 LBS

## 2024-01-09 DIAGNOSIS — N30.10 INTERSTITIAL CYSTITIS: ICD-10-CM

## 2024-01-09 DIAGNOSIS — E78.5 HYPERLIPIDEMIA ASSOCIATED WITH TYPE 2 DIABETES MELLITUS: ICD-10-CM

## 2024-01-09 DIAGNOSIS — E11.29 PERSISTENT MICROALBUMINURIA ASSOCIATED WITH TYPE 2 DIABETES MELLITUS: ICD-10-CM

## 2024-01-09 DIAGNOSIS — E11.69 HYPERLIPIDEMIA ASSOCIATED WITH TYPE 2 DIABETES MELLITUS: ICD-10-CM

## 2024-01-09 DIAGNOSIS — R80.9 PERSISTENT MICROALBUMINURIA ASSOCIATED WITH TYPE 2 DIABETES MELLITUS: ICD-10-CM

## 2024-01-09 DIAGNOSIS — R11.0 NAUSEA: ICD-10-CM

## 2024-01-09 DIAGNOSIS — Z79.4 UNCONTROLLED TYPE 2 DIABETES MELLITUS WITH HYPERGLYCEMIA, WITH LONG-TERM CURRENT USE OF INSULIN: Primary | ICD-10-CM

## 2024-01-09 DIAGNOSIS — E11.65 UNCONTROLLED TYPE 2 DIABETES MELLITUS WITH HYPERGLYCEMIA, WITH LONG-TERM CURRENT USE OF INSULIN: Primary | ICD-10-CM

## 2024-01-09 DIAGNOSIS — I10 PRIMARY HYPERTENSION: ICD-10-CM

## 2024-01-09 DIAGNOSIS — E11.42 DIABETIC PERIPHERAL NEUROPATHY ASSOCIATED WITH TYPE 2 DIABETES MELLITUS: ICD-10-CM

## 2024-01-09 LAB — GLUCOSE SERPL-MCNC: 116 MG/DL (ref 70–110)

## 2024-01-09 PROCEDURE — 95251 CONT GLUC MNTR ANALYSIS I&R: CPT | Mod: S$GLB,,, | Performed by: NURSE PRACTITIONER

## 2024-01-09 PROCEDURE — 3008F BODY MASS INDEX DOCD: CPT | Mod: CPTII,S$GLB,, | Performed by: NURSE PRACTITIONER

## 2024-01-09 PROCEDURE — 82962 GLUCOSE BLOOD TEST: CPT | Mod: S$GLB,,, | Performed by: NURSE PRACTITIONER

## 2024-01-09 PROCEDURE — 99999 PR PBB SHADOW E&M-EST. PATIENT-LVL V: CPT | Mod: PBBFAC,,, | Performed by: NURSE PRACTITIONER

## 2024-01-09 PROCEDURE — 1160F RVW MEDS BY RX/DR IN RCRD: CPT | Mod: CPTII,S$GLB,, | Performed by: NURSE PRACTITIONER

## 2024-01-09 PROCEDURE — 99214 OFFICE O/P EST MOD 30 MIN: CPT | Mod: S$GLB,,, | Performed by: NURSE PRACTITIONER

## 2024-01-09 PROCEDURE — 3080F DIAST BP >= 90 MM HG: CPT | Mod: CPTII,S$GLB,, | Performed by: NURSE PRACTITIONER

## 2024-01-09 PROCEDURE — 1159F MED LIST DOCD IN RCRD: CPT | Mod: CPTII,S$GLB,, | Performed by: NURSE PRACTITIONER

## 2024-01-09 PROCEDURE — 3077F SYST BP >= 140 MM HG: CPT | Mod: CPTII,S$GLB,, | Performed by: NURSE PRACTITIONER

## 2024-01-09 RX ORDER — INSULIN DEGLUDEC 200 U/ML
120 INJECTION, SOLUTION SUBCUTANEOUS DAILY
Qty: 6 PEN | Refills: 5 | Status: SHIPPED | OUTPATIENT
Start: 2024-01-09 | End: 2024-01-18 | Stop reason: SDUPTHER

## 2024-01-09 RX ORDER — TIRZEPATIDE 2.5 MG/.5ML
2.5 INJECTION, SOLUTION SUBCUTANEOUS
Qty: 4 PEN | Refills: 5 | Status: SHIPPED | OUTPATIENT
Start: 2024-01-09

## 2024-01-09 RX ORDER — ONDANSETRON 4 MG/1
4 TABLET, ORALLY DISINTEGRATING ORAL EVERY 8 HOURS PRN
Qty: 30 TABLET | Refills: 2 | Status: SHIPPED | OUTPATIENT
Start: 2024-01-09

## 2024-01-09 NOTE — PATIENT INSTRUCTIONS
Continue Tresiba 100 units ONCE daily.     Continue Novolog 50 units before each main meal for now.     Continue Mounjaro 2.5 mg once a week. Main side effects = appetite suppression/weight loss, possibly nausea, constipation/diarrhea or reflux. If the symptoms are mild, they do improve with continued use. If you develop any abdominal pain or you are not tolerating this, stop the medication and let me know.

## 2024-01-09 NOTE — PROGRESS NOTES
Subjective:         Patient ID: Leni Dumont is a 34 y.o. female.  Patient's current PCP is Biran Darby MD.     Chief Complaint: Diabetes Mellitus    HPI  Leni Dumont is a 34 y.o. White female presenting for a follow up for diabetes. Patient has been diagnosed with type 2 diabetes since age 17 .  Received diabetes education: Yes-[facility], 12/2023    CURRENT DM MEDICATIONS:   Diabetes Medications               insulin degludec (TRESIBA FLEXTOUCH U-200) 200 unit/mL (3 mL) insulin pen Inject 100 Units into the skin once daily.    NOVOLOG FLEXPEN U-100 INSULIN 100 unit/mL (3 mL) InPn pen Inject 50 Units into the skin 3 (three) times daily with meals.    repaglinide (PRANDIN) 2 MG tablet TAKE 1 TABLET BY MOUTH THREE TIMES DAILY BEFORE MEAL(S)    tirzepatide (MOUNJARO) 2.5 mg/0.5 mL PnIj Inject 2.5 mg into the skin every 7 days.           Past failed treatment include: Metformin - significant diarrhea; Trulicity - diarrhea/bladder pain; Prandin,Byetta,Glipizide- diarrhea. Lantus/Basaglar and Ozempic- failure to control diabetes     Blood glucose testing Continuous per Kal 3   Preferred lab: [facility]    Any episodes of hypoglycemia? 0% per Kal    Complications related to diabetes: peripheral neuropathy and microalbuminuria    Her blood sugar in the clinic today was:   Lab Results   Component Value Date    POCGLU 116 (A) 01/09/2024     Leni Dumont presents today for follow up visit to discuss diabetes management. Unfortunately Ozempic did not help diabetes control at all - she had to increase insulin doses. She is now on Mounjaro and doing much better-glycemic control is much improved. Using milk of magnesia as well as miralax and constipation is stable.     Per Kal download, for the last 14 days: Average glucose of 157 mg/dL. She is 78% in range. 21% of readings are high, with only 1% of readings >250. 0% hypoglycemia. Glucose variability of 21.9%. Estimated GMI 7.1%. Glycemic control is now stable. There  is nothing consistent enough to warrant adjustment. Based on review, meds to be continued as current.      Neuropathy- Followed by Dr. Linda.     Urologist- Dr. Roberts.     Current diet: Limited: Mainly carbs and meats. Can eat fresh green beans and carrots. Cannot eat wheat pasta. Eats a lot of chicken, tortillas, cheese. Can only drink Dasani water.     Activity Level:     Lab Results   Component Value Date    HGBA1C 10.9 (H) 11/28/2023    HGBA1C 10.5 (H) 04/11/2023    HGBA1C 11.1 (H) 06/29/2022     STANDARDS OF CARE  Diabetes Management Status    Statin: Taking  ACE/ARB: Not taking    Screening or Prevention Patient's value Goal Complete/Controlled?   HgA1C Testing and Control   Lab Results   Component Value Date    HGBA1C 10.9 (H) 11/28/2023      Annually/Less than 8% No   Lipid profile : 11/28/2023 Annually Yes   LDL control Lab Results   Component Value Date    LDLCALC 152.2 11/28/2023    Annually/Less than 100 mg/dl  No   Nephropathy screening Lab Results   Component Value Date    LABMICR 241.0 12/06/2023     Lab Results   Component Value Date    PROTEINUA Negative 09/29/2014     Lab Results   Component Value Date    UTPCR 0.05 06/11/2008      Annually Yes   Blood pressure BP Readings from Last 1 Encounters:   01/09/24 (!) 142/100    Less than 140/90 No   Dilated retinal exam : 04/24/2023 Annually Yes   Foot exam   Most Recent Foot Exam Date: Not Found Annually No Deferred      Labs reviewed and are noted below.    Lab Results   Component Value Date    WBC 11.25 09/29/2014    HGB 13.4 09/29/2014    HCT 41.4 09/29/2014     (H) 09/29/2014    CHOL 247 (H) 11/28/2023    TRIG 294 (H) 11/28/2023    HDL 36 (L) 11/28/2023    LDLCALC 152.2 11/28/2023    ALT 68 (H) 09/29/2014    AST 54 (H) 09/29/2014     (L) 11/28/2023    K 4.6 11/28/2023    CL 98 11/28/2023    ANIONGAP 11 11/28/2023    CREATININE 0.7 11/28/2023    ESTGFRAFRICA 144 12/11/2020    EGFRNONAA >60 09/29/2014    BUN 9 11/28/2023    CO2 22 (L)  11/28/2023    TSH 1.398 11/28/2023     (H) 11/28/2023    UTPCR 0.05 06/11/2008     Lab Results   Component Value Date    GLUTAMICACID 0.00 11/28/2023    CPEPTIDE 4.97 11/28/2023    TSH 1.398 11/28/2023    AJPZMSBT92 759 06/30/2023    CALCIUM 9.9 11/28/2023    PHOS 4.2 06/11/2008     Lab Results   Component Value Date    CPEPTIDE 4.97 11/28/2023     Lab Results   Component Value Date    GLUTAMICACID 0.00 11/28/2023     Glucose   Date Value Ref Range Status   11/28/2023 251 (H) 70 - 110 mg/dL Final     Anion Gap   Date Value Ref Range Status   11/28/2023 11 8 - 16 mmol/L Final     eGFR if    Date Value Ref Range Status   09/29/2014 >60 >60 mL/min/1.73 m^2 Final     eGFR    Date Value Ref Range Status   12/11/2020 144  Final     eGFR if non    Date Value Ref Range Status   09/29/2014 >60 >60 mL/min/1.73 m^2 Final     Comment:     Calculation used to obtain the estimated glomerular filtration  rate (eGFR) is the CKD-EPI equation. Since race is unknown   in our information system, the eGFR values for   -American and Non--American patients are given   for each creatinine result.         The following portions of the patient's history were reviewed and updated as appropriate: allergies, current medications, past family history, past medical history, past social history, past surgical history, and problem list.    Review of patient's allergies indicates:   Allergen Reactions    Dextromethorphan-guaifenesin     Dicyclomine     Doxycycline     Erythromycin     Exefen [pseudoephedrine-guaifenesin]     Medrol [methylprednisolone]     Phenylephrine      Social History     Socioeconomic History    Marital status: Single   Tobacco Use    Smoking status: Never    Smokeless tobacco: Never   Substance and Sexual Activity    Alcohol use: No    Drug use: No    Sexual activity: Yes     Past Medical History:   Diagnosis Date    Asthma     Diabetes mellitus     Heart  disease     Hyperlipidemia     Interstitial cystitis     Varicose veins of left leg with edema        REVIEW OF SYSTEMS:  Eyes No history of DR.  Cardiovascular: History of HTN and HLD.   GI: No history of pancreatitis or gastroparesis.  :H/o interstitial cystitis. Positive microalbuminuria.   SKIN: Denies rashes and lesions.  Neuro: Positive neuropathy.  PSYCH: No tobacco use.  ENDO: See HPI.        Objective:      Vitals:    01/09/24 1312   BP: (!) 142/100   Pulse: 88     RESPIRATORY: No respiratory distress  NEUROLOGIC: Cranial nerves II-XII grossly intact.   PSYCHIATRIC: Alert & oriented x3. Normal mood and affect.  FOOT EXAMINATION: Deferred  Assessment:       1. Uncontrolled type 2 diabetes mellitus with hyperglycemia, with long-term current use of insulin    2. Nausea    3. Hyperlipidemia associated with type 2 diabetes mellitus    4. Primary hypertension    5. Interstitial cystitis    6. Persistent microalbuminuria associated with type 2 diabetes mellitus    7. Diabetic peripheral neuropathy associated with type 2 diabetes mellitus        Plan:   Uncontrolled type 2 diabetes mellitus with hyperglycemia, with long-term current use of insulin  -     POCT Glucose, Hand-Held Device  -     tirzepatide (MOUNJARO) 2.5 mg/0.5 mL PnIj; Inject 2.5 mg into the skin every 7 days.  Dispense: 4 pen ; Refill: 5  -     insulin degludec (TRESIBA FLEXTOUCH U-200) 200 unit/mL (3 mL) insulin pen; Inject 120 Units into the skin once daily.  Dispense: 6 pen ; Refill: 5    Chronic -  Will have labs drawn on day of next visit.    Continue Tresiba 100 units ONCE daily.     Continue Novolog 50 units before each main meal for now.     Continue Mounjaro 2.5 mg once a week.     Continuous glucose monitoring report:    The patient's CGM was downloaded and was reviewed for the last 14 days. See HPI for interpretation & plan.    Nausea  -     ondansetron (ZOFRAN-ODT) 4 MG TbDL; Take 1 tablet (4 mg total) by mouth every 8 (eight) hours as  "needed (nausea).  Dispense: 30 tablet; Refill: 2    Hyperlipidemia associated with type 2 diabetes mellitus    Primary hypertension    Interstitial cystitis    Persistent microalbuminuria associated with type 2 diabetes mellitus    Diabetic peripheral neuropathy associated with type 2 diabetes mellitus    - Follow up: 2 months    Portions of this note may have been created with voice recognition software. Occasional "wrong-word" or "sound-a-like" substitutions may have occurred due to the inherent limitations of voice recognition software. Please, read the note carefully and recognize, using context, where substitutions have occurred.           Angelina Clemente,KIRSTINC, BC-ADM  [facility]  "

## 2024-01-18 ENCOUNTER — PATIENT MESSAGE (OUTPATIENT)
Dept: DIABETES | Facility: CLINIC | Age: 34
End: 2024-01-18
Payer: COMMERCIAL

## 2024-01-18 DIAGNOSIS — Z79.4 UNCONTROLLED TYPE 2 DIABETES MELLITUS WITH HYPERGLYCEMIA, WITH LONG-TERM CURRENT USE OF INSULIN: ICD-10-CM

## 2024-01-18 DIAGNOSIS — E11.65 UNCONTROLLED TYPE 2 DIABETES MELLITUS WITH HYPERGLYCEMIA, WITH LONG-TERM CURRENT USE OF INSULIN: ICD-10-CM

## 2024-01-18 RX ORDER — INSULIN DEGLUDEC 200 U/ML
150 INJECTION, SOLUTION SUBCUTANEOUS DAILY
Qty: 9 PEN | Refills: 5 | Status: SHIPPED | OUTPATIENT
Start: 2024-01-18 | End: 2024-01-19

## 2024-01-19 RX ORDER — INSULIN DEGLUDEC 200 U/ML
INJECTION, SOLUTION SUBCUTANEOUS
Qty: 9 PEN | Refills: 5 | Status: SHIPPED | OUTPATIENT
Start: 2024-01-19

## 2024-01-19 NOTE — TELEPHONE ENCOUNTER
Has been taken care of with the pharmacist, had to dispense as DAW1 which means Brand only. ROXANA means brand but can also dispense generic. Insurance also requires a 3 month supply, a 3 month supply was dispensed with 1 additional refill, with directions, inject 150 units daily; and it went through on insurance.    Couldn't delete the Rx request on my end

## 2024-02-22 ENCOUNTER — TELEPHONE (OUTPATIENT)
Dept: DIABETES | Facility: CLINIC | Age: 34
End: 2024-02-22
Payer: COMMERCIAL

## 2024-02-22 ENCOUNTER — CLINICAL SUPPORT (OUTPATIENT)
Dept: DIABETES | Facility: CLINIC | Age: 34
End: 2024-02-22
Payer: COMMERCIAL

## 2024-02-22 DIAGNOSIS — Z79.4 UNCONTROLLED TYPE 2 DIABETES MELLITUS WITH HYPERGLYCEMIA, WITH LONG-TERM CURRENT USE OF INSULIN: Primary | ICD-10-CM

## 2024-02-22 DIAGNOSIS — E11.65 UNCONTROLLED TYPE 2 DIABETES MELLITUS WITH HYPERGLYCEMIA, WITH LONG-TERM CURRENT USE OF INSULIN: Primary | ICD-10-CM

## 2024-02-22 PROCEDURE — G0108 DIAB MANAGE TRN  PER INDIV: HCPCS | Mod: 95,,, | Performed by: DIETITIAN, REGISTERED

## 2024-02-22 NOTE — PROGRESS NOTES
Diabetes Care Specialist Virtual Visit Note     The patient location is: home in [place]  The chief complaint leading to consultation is: Diabetes  Visit type: audiovisual  Total time spent with patient: 30 min   Each patient to whom he or she provides medical services by telemedicine is:  (1) informed of the relationship between the physician and patient and the respective role of any other health care provider with respect to management of the patient; and (2) notified that he or she may decline to receive medical services by telemedicine and may withdraw from such care at any time.    Diabetes Care Specialist Follow-up Note  Author: Maricruz Beckford RD, CDE  Date: 2/22/2024    Program Intake  Reason for Diabetes Program Visit:: Post Program Follow-Up (DM referral 11/25/23 Angelina Clemente NP)  Type of Intervention:: Individual  Education: Self-Management Skill Review  Device Training: Personal CGM (Kal 3 training follow-up)  Current diabetes risk level:: moderate  In the last 12 months, have you:: used emergency room services  Was the ER or hospital admission related to diabetes?: No  Continuous Glucose Monitoring  Patient has CGM: Yes  Personal CGM type:: Kal 3  GMI Date: 02/22/24  GMI Value: 7.2 %    Lab Results   Component Value Date    HGBA1C 10.9 (H) 11/28/2023   Pending repeat A1C.    Clinical    Problem Review  Active comorbidities affecting diabetes self-care.:  (HTN, HLD, Neuropathy, Microalbuminuria, Heart disease, Interstitial cystitis; noted diabetes since 16yo.)    Clinical Assessment  Current Diabetes Treatment:  (Mounjaro 2.5 mg weekly. Tresiba 100units daily. Novolog ac pt dosing 50units lunch, 60units dinner.)  Have you ever experienced hypoglycemia (low blood sugar)?: no (none recent)  Have you ever experienced hyperglycemia (high blood sugar)?: no (pt denies symptoms)    Nutritional Status  Diet:  (Pt reports 2meals w/ snacks daily; misses bfst. Continues limit fried foods, sugary  nuris/snacks. Excess total/sat fat, sodium from processed foods. Pt not ready to try healthier version MR entrees for fear of bladder pain.)  Meal Plan 24 Hour Recall - Breakfast: none  Meal Plan 24 Hour Recall - Lunch: Dinty Cervantes beef stew, water  Meal Plan 24 Hour Recall - Dinner: froz chix nuggets, mac/cheese  Meal Plan 24 Hour Recall - Snack: sf shortbread cookies 4 -- dining out 1x/wk; nuris: mostly water  Change in appetite?: No  Current nutritional status an area of need that is impacting patient's ability to self-manage diabetes?: Yes    Physical activity/Exercise: none structured    SMBG: Kal reports reviewed w/ pt & saved media.        During today's follow-up visit,  the following areas required further assessment and content was provided/reviewed.  Based on today's diabetes care assessment, the following areas of need were identified:          2/22/2024    12:01 AM   Clinical   Nutritional Status Yes - see care plan      Today's interventions were provided through individual discussion, instruction, and written materials were provided.    Patient verbalized understanding of instruction and written materials.  Pt was able to return back demonstration of instructions today. Patient understood key points, needs reinforcement and further instruction.     Diabetes Self-Management Care Plan Review and Evaluation of Progress:  During today's follow-up Leni's Diabetes Self-Management Care Plan progress was reviewed and progress was evaluated including his/her input. Leni has agreed to continue his/her journey to improve/maintain overall diabetes control by continuing to set health goals. See care plan progress below.      Care Plan: Diabetes Management   Updates made since 1/23/2024 12:00 AM        Problem: Blood Glucose Self-Monitoring         Goal: Patient agrees to use Freestyle Kal 3 and backup meter as directed. Completed 2/22/2024   Start Date: 12/6/2023   Expected End Date: 11/25/2024   This  Visit's Progress: Met   Recent Progress: On track   Priority: High   Barriers: No Barriers Identified   Note:    Encouraged progress and consistency of use. Reviewed tips for skin care due to irritation. Pt found new overlay tape for sensitive skin and planning to try with new sensor application. Of note, she reports irritation to Tegaderm. Instructed on how to use Flonase during site prep. Mychart msg sent with written tips. Pt verbalized understanding.          Problem: Medications         Goal: Patient Agrees to take Diabetes Medication(s) as prescribed. Completed 2/22/2024   Start Date: 12/6/2023   Expected End Date: 11/25/2024   This Visit's Progress: Met   Recent Progress: On track   Priority: Low   Barriers: No Barriers Identified   Note:    Encouraged progress & fu w/ provider Reggie for medical mgmt.        Problem: Healthy Eating         Goal: Eat 3 meals daily - manage carb 30-45grams/meal, 5-15grams/snack. Completed 2/22/2024   Start Date: 12/6/2023   Expected End Date: 11/25/2024   This Visit's Progress: Met   Recent Progress: On track   Priority: Medium   Barriers: Other (comments)   Note:    Pt is meeting goal to her best ability. Reviewed simple, healthier version MR entrees to improve quality processed foods. However, she reports concern to try new foods due fear of interstitial cystitis pain.       Follow Up Plan   Follow up if symptoms worsen or fail to improve.    Today's care plan and follow up schedule was discussed with patient.  Leni verbalized understanding of the care plan, goals, and agrees to follow up plan.        The patient was encouraged to communicate with his/her health care provider/physician and care team regarding his/her condition(s) and treatment.  I provided the patient with my contact information today and encouraged to contact me via phone or [facility] Portal as needed.     Length of Visit   Total Time: 30 Minutes

## 2024-02-22 NOTE — LETTER
February 22, 2024      Angelina lCemente, VANDANA  [address]  [place] [address]         Patient: Leni Dumont   MR Number: [id number]   YOB: 1990   Date of Visit: 2/22/2024       Dear Dr. Clemente:    Thank you for referring Leni for diabetes self-management education and support services. She has completed all components of our Diabetes Management Program. Below is a summary of her clinical outcomes and goal progress.    Patient Outcomes:    A1c Status:   Lab Results   Component Value Date    HGBA1C 10.9 (H) 11/28/2023    HGBA1C 10.5 (H) 04/11/2023    HGBA1C 11.1 (H) 06/29/2022    HGBA1C 5.6 06/11/2008       Care Plan: Diabetes Management   Updates made since 1/23/2024 12:00 AM        Problem: Blood Glucose Self-Monitoring         Goal: Patient agrees to use Freestyle Kal 3 and backup meter as directed. Completed 2/22/2024   Start Date: 12/6/2023   Expected End Date: 11/25/2024   This Visit's Progress: Met   Recent Progress: On track   Priority: High   Barriers: No Barriers Identified   Note:    Encouraged progress and consistency of use. Reviewed tips for skin care due to irritation. Pt found new overlay tape for sensitive skin and planning to try with new sensor application. Of note, she reports irritation to Tegaderm. Instructed on how to use Flonase during site prep. [facility] msg sent with written tips. Pt verbalized understanding.          Problem: Medications         Goal: Patient Agrees to take Diabetes Medication(s) as prescribed. Completed 2/22/2024   Start Date: 12/6/2023   Expected End Date: 11/25/2024   This Visit's Progress: Met   Recent Progress: On track   Priority: Low   Barriers: No Barriers Identified   Note:    Encouraged progress & fu w/ provider Reggie for medical mgmt.        Problem: Healthy Eating         Goal: Eat 3 meals daily - manage carb 30-45grams/meal, 5-15grams/snack. Completed 2/22/2024   Start Date: 12/6/2023   Expected End Date: 11/25/2024   This  Visit's Progress: Met   Recent Progress: On track   Priority: Medium   Barriers: Other (comments)   Note:    Pt is meeting goal to her best ability. Reviewed simple, healthier version MR entrees to improve quality processed foods. However, she reports concern to try new foods due fear of interstitial cystitis pain.         Follow up:   Leni to attend medical appointments as scheduled  Leni to update you on her DM education progress as needed      If you have questions, please do not hesitate to call me. I look forward to providing additional education and support as needed.    Sincerely,    Maricruz Beckford, RD, CDE  Diabetes Care and

## 2024-05-02 ENCOUNTER — PATIENT MESSAGE (OUTPATIENT)
Dept: DIABETES | Facility: CLINIC | Age: 34
End: 2024-05-02
Payer: COMMERCIAL

## 2024-05-03 ENCOUNTER — TELEPHONE (OUTPATIENT)
Dept: DIABETES | Facility: CLINIC | Age: 34
End: 2024-05-03
Payer: COMMERCIAL

## 2024-05-13 DIAGNOSIS — E11.69 HYPERLIPIDEMIA ASSOCIATED WITH TYPE 2 DIABETES MELLITUS: ICD-10-CM

## 2024-05-13 DIAGNOSIS — E78.5 HYPERLIPIDEMIA ASSOCIATED WITH TYPE 2 DIABETES MELLITUS: ICD-10-CM

## 2024-05-13 RX ORDER — ROSUVASTATIN CALCIUM 10 MG/1
10 TABLET, COATED ORAL
Qty: 90 TABLET | Refills: 1 | Status: SHIPPED | OUTPATIENT
Start: 2024-05-13

## 2024-06-02 ENCOUNTER — PATIENT MESSAGE (OUTPATIENT)
Dept: DIABETES | Facility: CLINIC | Age: 34
End: 2024-06-02
Payer: COMMERCIAL

## 2024-06-14 DIAGNOSIS — R11.0 NAUSEA: ICD-10-CM

## 2024-06-14 RX ORDER — ONDANSETRON 4 MG/1
4 TABLET, ORALLY DISINTEGRATING ORAL EVERY 8 HOURS PRN
Qty: 30 TABLET | Refills: 0 | Status: SHIPPED | OUTPATIENT
Start: 2024-06-14

## 2024-06-14 NOTE — TELEPHONE ENCOUNTER
Courtesy refill of Zofran given for coverage of VANDANA Cunningham.     Zeyad Tubbs PA-C  Diabetes Management

## 2024-06-23 DIAGNOSIS — E11.65 UNCONTROLLED TYPE 2 DIABETES MELLITUS WITH HYPERGLYCEMIA, WITH LONG-TERM CURRENT USE OF INSULIN: ICD-10-CM

## 2024-06-23 DIAGNOSIS — Z79.4 UNCONTROLLED TYPE 2 DIABETES MELLITUS WITH HYPERGLYCEMIA, WITH LONG-TERM CURRENT USE OF INSULIN: ICD-10-CM

## 2024-06-24 RX ORDER — INSULIN ASPART 100 [IU]/ML
INJECTION, SOLUTION INTRAVENOUS; SUBCUTANEOUS
Qty: 45 ML | Refills: 1 | Status: SHIPPED | OUTPATIENT
Start: 2024-06-24

## 2024-06-26 ENCOUNTER — TELEPHONE (OUTPATIENT)
Dept: DIABETES | Facility: CLINIC | Age: 34
End: 2024-06-26

## 2024-06-26 ENCOUNTER — OFFICE VISIT (OUTPATIENT)
Dept: DIABETES | Facility: CLINIC | Age: 34
End: 2024-06-26
Payer: COMMERCIAL

## 2024-06-26 ENCOUNTER — TELEPHONE (OUTPATIENT)
Dept: PULMONOLOGY | Facility: CLINIC | Age: 34
End: 2024-06-26
Payer: COMMERCIAL

## 2024-06-26 ENCOUNTER — LAB VISIT (OUTPATIENT)
Dept: LAB | Facility: HOSPITAL | Age: 34
End: 2024-06-26
Attending: NURSE PRACTITIONER
Payer: COMMERCIAL

## 2024-06-26 VITALS
SYSTOLIC BLOOD PRESSURE: 129 MMHG | BODY MASS INDEX: 47.09 KG/M2 | HEIGHT: 64 IN | WEIGHT: 275.81 LBS | HEART RATE: 81 BPM | DIASTOLIC BLOOD PRESSURE: 89 MMHG

## 2024-06-26 DIAGNOSIS — I10 PRIMARY HYPERTENSION: ICD-10-CM

## 2024-06-26 DIAGNOSIS — J45.909 ASTHMA, UNSPECIFIED ASTHMA SEVERITY, UNSPECIFIED WHETHER COMPLICATED, UNSPECIFIED WHETHER PERSISTENT: ICD-10-CM

## 2024-06-26 DIAGNOSIS — J45.909 ASTHMA, UNSPECIFIED ASTHMA SEVERITY, UNSPECIFIED WHETHER COMPLICATED, UNSPECIFIED WHETHER PERSISTENT: Primary | ICD-10-CM

## 2024-06-26 DIAGNOSIS — R80.9 PERSISTENT MICROALBUMINURIA ASSOCIATED WITH TYPE 2 DIABETES MELLITUS: ICD-10-CM

## 2024-06-26 DIAGNOSIS — E11.65 UNCONTROLLED TYPE 2 DIABETES MELLITUS WITH HYPERGLYCEMIA, WITH LONG-TERM CURRENT USE OF INSULIN: ICD-10-CM

## 2024-06-26 DIAGNOSIS — E78.5 HYPERLIPIDEMIA ASSOCIATED WITH TYPE 2 DIABETES MELLITUS: ICD-10-CM

## 2024-06-26 DIAGNOSIS — N30.10 INTERSTITIAL CYSTITIS: ICD-10-CM

## 2024-06-26 DIAGNOSIS — E11.42 DIABETIC PERIPHERAL NEUROPATHY ASSOCIATED WITH TYPE 2 DIABETES MELLITUS: ICD-10-CM

## 2024-06-26 DIAGNOSIS — E11.65 UNCONTROLLED TYPE 2 DIABETES MELLITUS WITH HYPERGLYCEMIA, WITH LONG-TERM CURRENT USE OF INSULIN: Primary | ICD-10-CM

## 2024-06-26 DIAGNOSIS — E11.69 HYPERLIPIDEMIA ASSOCIATED WITH TYPE 2 DIABETES MELLITUS: ICD-10-CM

## 2024-06-26 DIAGNOSIS — E11.29 PERSISTENT MICROALBUMINURIA ASSOCIATED WITH TYPE 2 DIABETES MELLITUS: ICD-10-CM

## 2024-06-26 DIAGNOSIS — Z79.4 UNCONTROLLED TYPE 2 DIABETES MELLITUS WITH HYPERGLYCEMIA, WITH LONG-TERM CURRENT USE OF INSULIN: Primary | ICD-10-CM

## 2024-06-26 DIAGNOSIS — Z79.4 UNCONTROLLED TYPE 2 DIABETES MELLITUS WITH HYPERGLYCEMIA, WITH LONG-TERM CURRENT USE OF INSULIN: ICD-10-CM

## 2024-06-26 LAB
CHOLEST SERPL-MCNC: 251 MG/DL (ref 120–199)
CHOLEST/HDLC SERPL: 6.4 {RATIO} (ref 2–5)
ESTIMATED AVG GLUCOSE: 180 MG/DL (ref 68–131)
GLUCOSE SERPL-MCNC: 137 MG/DL (ref 70–110)
HBA1C MFR BLD: 7.9 % (ref 4–5.6)
HDLC SERPL-MCNC: 39 MG/DL (ref 40–75)
HDLC SERPL: 15.5 % (ref 20–50)
LDLC SERPL CALC-MCNC: 179.8 MG/DL (ref 63–159)
NONHDLC SERPL-MCNC: 212 MG/DL
TRIGL SERPL-MCNC: 161 MG/DL (ref 30–150)

## 2024-06-26 PROCEDURE — 36415 COLL VENOUS BLD VENIPUNCTURE: CPT | Performed by: NURSE PRACTITIONER

## 2024-06-26 PROCEDURE — 80061 LIPID PANEL: CPT | Performed by: NURSE PRACTITIONER

## 2024-06-26 PROCEDURE — 82962 GLUCOSE BLOOD TEST: CPT | Mod: S$GLB,,, | Performed by: NURSE PRACTITIONER

## 2024-06-26 PROCEDURE — 99214 OFFICE O/P EST MOD 30 MIN: CPT | Mod: S$GLB,,, | Performed by: NURSE PRACTITIONER

## 2024-06-26 PROCEDURE — 1159F MED LIST DOCD IN RCRD: CPT | Mod: CPTII,S$GLB,, | Performed by: NURSE PRACTITIONER

## 2024-06-26 PROCEDURE — 1160F RVW MEDS BY RX/DR IN RCRD: CPT | Mod: CPTII,S$GLB,, | Performed by: NURSE PRACTITIONER

## 2024-06-26 PROCEDURE — 3008F BODY MASS INDEX DOCD: CPT | Mod: CPTII,S$GLB,, | Performed by: NURSE PRACTITIONER

## 2024-06-26 PROCEDURE — 83036 HEMOGLOBIN GLYCOSYLATED A1C: CPT | Performed by: NURSE PRACTITIONER

## 2024-06-26 PROCEDURE — 3079F DIAST BP 80-89 MM HG: CPT | Mod: CPTII,S$GLB,, | Performed by: NURSE PRACTITIONER

## 2024-06-26 PROCEDURE — 99999 PR PBB SHADOW E&M-EST. PATIENT-LVL V: CPT | Mod: PBBFAC,,, | Performed by: NURSE PRACTITIONER

## 2024-06-26 PROCEDURE — 95251 CONT GLUC MNTR ANALYSIS I&R: CPT | Mod: S$GLB,,, | Performed by: NURSE PRACTITIONER

## 2024-06-26 PROCEDURE — 3074F SYST BP LT 130 MM HG: CPT | Mod: CPTII,S$GLB,, | Performed by: NURSE PRACTITIONER

## 2024-06-26 RX ORDER — PHENAZOPYRIDINE HYDROCHLORIDE 200 MG/1
200 TABLET, FILM COATED ORAL
COMMUNITY
Start: 2024-06-21

## 2024-06-26 RX ORDER — FUROSEMIDE 20 MG/1
20 TABLET ORAL DAILY
COMMUNITY
Start: 2024-06-25

## 2024-06-26 RX ORDER — HYDROCORTISONE ACETATE, PRAMOXINE HCL 2.5; 1 G/100G; G/100G
CREAM TOPICAL 2 TIMES DAILY PRN
COMMUNITY
Start: 2023-10-17 | End: 2024-10-16

## 2024-06-26 RX ORDER — LEVOFLOXACIN 500 MG/1
500 TABLET, FILM COATED ORAL EVERY MORNING
COMMUNITY
Start: 2024-06-21

## 2024-06-26 RX ORDER — KETOCONAZOLE 20 MG/G
CREAM TOPICAL 2 TIMES DAILY PRN
COMMUNITY
Start: 2023-08-09

## 2024-06-26 RX ORDER — HYDROCORTISONE 25 MG/G
CREAM TOPICAL 2 TIMES DAILY PRN
COMMUNITY
Start: 2024-05-01

## 2024-06-26 RX ORDER — BLOOD-GLUCOSE SENSOR
EACH MISCELLANEOUS
Qty: 2 EACH | Refills: 11 | Status: SHIPPED | OUTPATIENT
Start: 2024-06-26

## 2024-06-26 NOTE — PROGRESS NOTES
Subjective:         Patient ID: Leni Dumont is a 34 y.o. female.  Patient's current PCP is Brian Darby MD.     Chief Complaint: Diabetes Mellitus    HPI  Leni Dumont is a 34 y.o. White female presenting for a follow up for diabetes. Patient has been diagnosed with type 2 diabetes since age 17 .  Received diabetes education: Yes-[facility], 12/2023    CURRENT DM MEDICATIONS:   Diabetes Medications               NOVOLOG FLEXPEN U-100 INSULIN 100 unit/mL (3 mL) InPn pen INJECT 50 UNITS SUBCUTANEOUSLY THREE TIMES DAILY WITH MEALS    repaglinide (PRANDIN) 2 MG tablet TAKE 1 TABLET BY MOUTH THREE TIMES DAILY BEFORE MEAL(S)    tirzepatide 2.5 mg/0.5 mL PnIj Inject 2.5 mg into the skin every 7 days.    TRESIBA FLEXTOUCH U-200 200 unit/mL (3 mL) insulin pen INJECT 150 UNITS INTO THE SKIN ONCE DAILY. DOSE INCREASE PLEASE ALLOW EARLY REFILL 100 units     Past failed treatment include: Metformin - significant diarrhea; Trulicity - diarrhea/bladder pain; Prandin,Byetta,Glipizide- diarrhea. Lantus/Basaglar and Ozempic- failure to control diabetes     Blood glucose testing Continuous per [employer]   Preferred lab: [facility]    Any episodes of hypoglycemia? 0% per [facility]    Complications related to diabetes: peripheral neuropathy and microalbuminuria    Her blood sugar in the clinic today was:   Lab Results   Component Value Date    POCGLU 137 (A) 06/26/2024     Leni Dumont presents today for follow up visit to discuss diabetes management.  Reports worsening asthma, needs to see a pulmonologist. Also, urologist with [facility] has left the practice and needs a new referral to establish care for interstitial cystitis.    Per [facility] download, for the last 14 days: Average glucose of 181 mg/dL. She is 59% in range. 32% of readings are high, with 9% of readings > 250. 0% hypoglycemia. Fasting Bgs consistently > 130. Pattern of AM hyperglycemia. Based on review, increase of Tresiba warranted.         Neuropathy- Followed by Dr. Linda.      Urologist- Dr. Roberts who has left the practice.      Current diet: Limited: Mainly carbs and meats. Can eat fresh green beans and carrots. Cannot eat wheat pasta. Eats a lot of chicken, tortillas, cheese. Can only drink Dasani water.     Activity Level:     Lab Results   Component Value Date    HGBA1C 10.9 (H) 11/28/2023    HGBA1C 10.5 (H) 04/11/2023    HGBA1C 11.1 (H) 06/29/2022     STANDARDS OF CARE  Diabetes Management Status    Statin: Taking  ACE/ARB: Not taking    Screening or Prevention Patient's value Goal Complete/Controlled?   HgA1C Testing and Control   Lab Results   Component Value Date    HGBA1C 10.9 (H) 11/28/2023      Annually/Less than 8% No   Lipid profile : 11/28/2023 Annually Yes   LDL control Lab Results   Component Value Date    LDLCALC 152.2 11/28/2023    Annually/Less than 100 mg/dl  No   Nephropathy screening Lab Results   Component Value Date    LABMICR 241.0 12/06/2023     Lab Results   Component Value Date    PROTEINUA Negative 09/29/2014     Lab Results   Component Value Date    UTPCR 0.05 06/11/2008      Annually Yes   Blood pressure BP Readings from Last 1 Encounters:   06/26/24 129/89    Less than 140/90 Yes   Dilated retinal exam : 04/24/2023 Annually No   Foot exam   Most Recent Foot Exam Date: Not Found Annually No      Labs reviewed and are noted below.    Lab Results   Component Value Date    WBC 11.25 09/29/2014    HGB 13.4 09/29/2014    HCT 41.4 09/29/2014     (H) 09/29/2014    CHOL 247 (H) 11/28/2023    TRIG 294 (H) 11/28/2023    HDL 36 (L) 11/28/2023    LDLCALC 152.2 11/28/2023    ALT 68 (H) 09/29/2014    AST 54 (H) 09/29/2014     (L) 11/28/2023    K 4.6 11/28/2023    CL 98 11/28/2023    ANIONGAP 11 11/28/2023    CREATININE 0.7 11/28/2023    ESTGFRAFRICA 144 12/11/2020    EGFRNONAA >60 09/29/2014    BUN 9 11/28/2023    CO2 22 (L) 11/28/2023    TSH 1.398 11/28/2023     (H) 11/28/2023    UTPCR 0.05 06/11/2008     Lab Results   Component Value Date     GLUTAMICACID 0.00 11/28/2023    CPEPTIDE 4.97 11/28/2023    TSH 1.398 11/28/2023    NJVXWGTT04 759 06/30/2023    CALCIUM 9.9 11/28/2023    PHOS 4.2 06/11/2008     Lab Results   Component Value Date    CPEPTIDE 4.97 11/28/2023     Lab Results   Component Value Date    GLUTAMICACID 0.00 11/28/2023     Glucose   Date Value Ref Range Status   11/28/2023 251 (H) 70 - 110 mg/dL Final     Anion Gap   Date Value Ref Range Status   11/28/2023 11 8 - 16 mmol/L Final     eGFR if    Date Value Ref Range Status   09/29/2014 >60 >60 mL/min/1.73 m^2 Final     eGFR    Date Value Ref Range Status   12/11/2020 144  Final     eGFR if non    Date Value Ref Range Status   09/29/2014 >60 >60 mL/min/1.73 m^2 Final     Comment:     Calculation used to obtain the estimated glomerular filtration  rate (eGFR) is the CKD-EPI equation. Since race is unknown   in our information system, the eGFR values for   -American and Non--American patients are given   for each creatinine result.         The following portions of the patient's history were reviewed and updated as appropriate: allergies, current medications, past family history, past medical history, past social history, past surgical history, and problem list.    Review of patient's allergies indicates:   Allergen Reactions    Dextromethorphan-guaifenesin     Dicyclomine     Doxycycline     Erythromycin     Exefen [pseudoephedrine-guaifenesin]     Medrol [methylprednisolone]     Phenylephrine      Social History     Socioeconomic History    Marital status: Single   Tobacco Use    Smoking status: Never    Smokeless tobacco: Never   Substance and Sexual Activity    Alcohol use: No    Drug use: No    Sexual activity: Yes     Past Medical History:   Diagnosis Date    Asthma     Diabetes mellitus     Heart disease     Hyperlipidemia     Interstitial cystitis     Varicose veins of left leg with edema        REVIEW OF SYSTEMS:  Eyes No  history of   Cardiovascular: History of HTN and HLD.   GI: No history of pancreatitis or gastroparesis.  :H/o interstitial cystitis. Positive microalbuminuria.   Neuro: Positive neuropathy.  PSYCH: No tobacco use.  ENDO: See HPI.        Objective:      Vitals:    06/26/24 1308   BP: 129/89   Pulse: 81   RESPIRATORY: No respiratory distress  NEUROLOGIC: Cranial nerves II-XII grossly intact.   PSYCHIATRIC: Alert & oriented x3. Normal mood and affect.  FOOT EXAMINATION: Deferred  Assessment:       1. Uncontrolled type 2 diabetes mellitus with hyperglycemia, with long-term current use of insulin    2. Hyperlipidemia associated with type 2 diabetes mellitus    3. Primary hypertension    4. Persistent microalbuminuria associated with type 2 diabetes mellitus    5. Diabetic peripheral neuropathy associated with type 2 diabetes mellitus    6. Asthma, unspecified asthma severity, unspecified whether complicated, unspecified whether persistent    7. Interstitial cystitis        Plan:   Leni was seen today for diabetes mellitus.    Diagnoses and all orders for this visit:    Uncontrolled type 2 diabetes mellitus with hyperglycemia, with long-term current use of insulin  -     POCT Glucose, Hand-Held Device  -     Hemoglobin A1C; Future  -     Lipid Panel; Future  -     FREESTYLE SWATI 3 SENSOR Jigna; Change sensor every 14 days    Chronic -     Increase Tresiba 110 units ONCE daily.     Continue Novolog 50 units before each main meal for now.     Continue Mounjaro 2.5 mg once a week. Main side effects = appetite suppression/weight loss, possibly nausea, constipation/diarrhea or reflux. If the symptoms are mild, they do improve with continued use. If you develop any abdominal pain or you are not tolerating this, stop the medication and let me know.    Continuous glucose monitoring report:    The patient's CGM was downloaded and was reviewed for the last 14 days. See HPI for interpretation & plan.    Hyperlipidemia  "associated with type 2 diabetes mellitus    Primary hypertension    Persistent microalbuminuria associated with type 2 diabetes mellitus    Diabetic peripheral neuropathy associated with type 2 diabetes mellitus    Asthma, unspecified asthma severity, unspecified whether complicated, unspecified whether persistent  -     Ambulatory referral/consult to Pulmonology; Future    Interstitial cystitis  -     Ambulatory referral/consult to Urology; Future        - Follow up: 3 months    Portions of this note may have been created with voice recognition software. Occasional "wrong-word" or "sound-a-like" substitutions may have occurred due to the inherent limitations of voice recognition software. Please, read the note carefully and recognize, using context, where substitutions have occurred.           Angelina Clemente,EVELINP-C, BC-ADM  [facility]  "

## 2024-06-26 NOTE — PATIENT INSTRUCTIONS
Increase Tresiba 110 units ONCE daily.     Continue Novolog 50 units before each main meal for now.     Continue Mounjaro 2.5 mg once a week. Main side effects = appetite suppression/weight loss, possibly nausea, constipation/diarrhea or reflux. If the symptoms are mild, they do improve with continued use. If you develop any abdominal pain or you are not tolerating this, stop the medication and let me know.

## 2024-06-27 NOTE — PROGRESS NOTES
Good improvement in A1c from 10.9% to 7.9%.  Cholesterol is still very high- worse over previous. Taking the Crestor daily, consistently?

## 2024-07-01 ENCOUNTER — HOSPITAL ENCOUNTER (OUTPATIENT)
Dept: RADIOLOGY | Facility: HOSPITAL | Age: 34
Discharge: HOME | End: 2024-07-01
Attending: INTERNAL MEDICINE
Payer: COMMERCIAL

## 2024-07-01 DIAGNOSIS — J45.909 ASTHMA, UNSPECIFIED ASTHMA SEVERITY, UNSPECIFIED WHETHER COMPLICATED, UNSPECIFIED WHETHER PERSISTENT: ICD-10-CM

## 2024-07-01 PROCEDURE — 71046 X-RAY EXAM CHEST 2 VIEWS: CPT | Mod: TC

## 2024-07-01 PROCEDURE — 71046 X-RAY EXAM CHEST 2 VIEWS: CPT | Mod: 26,,, | Performed by: STUDENT IN AN ORGANIZED HEALTH CARE EDUCATION/TRAINING PROGRAM

## 2024-07-15 ENCOUNTER — PATIENT MESSAGE (OUTPATIENT)
Dept: DIABETES | Facility: CLINIC | Age: 34
End: 2024-07-15
Payer: COMMERCIAL

## 2024-07-22 ENCOUNTER — OFFICE VISIT (OUTPATIENT)
Dept: UROLOGY | Facility: CLINIC | Age: 34
End: 2024-07-22
Payer: COMMERCIAL

## 2024-07-22 VITALS — BODY MASS INDEX: 47.12 KG/M2 | WEIGHT: 276 LBS | HEIGHT: 64 IN | RESPIRATION RATE: 18 BRPM

## 2024-07-22 DIAGNOSIS — N30.10 INTERSTITIAL CYSTITIS: Primary | ICD-10-CM

## 2024-07-22 DIAGNOSIS — N32.81 OAB (OVERACTIVE BLADDER): ICD-10-CM

## 2024-07-22 LAB
BILIRUB UR QL STRIP: NEGATIVE
GLUCOSE UR QL STRIP: NEGATIVE
KETONES UR QL STRIP: NEGATIVE
LEUKOCYTE ESTERASE UR QL STRIP: NEGATIVE
PH, POC UA: 0.2
POC BLOOD, URINE: NEGATIVE
POC NITRATES, URINE: NEGATIVE
POC RESIDUAL URINE VOLUME: 202 ML (ref 0–100)
PROT UR QL STRIP: POSITIVE
SP GR UR STRIP: 1.03 (ref 1–1.03)
UROBILINOGEN UR STRIP-ACNC: 0.2 (ref 0.1–1.1)

## 2024-07-22 PROCEDURE — 99999 PR PBB SHADOW E&M-EST. PATIENT-LVL III: CPT | Mod: PBBFAC,,, | Performed by: UROLOGY

## 2024-07-22 PROCEDURE — 51798 US URINE CAPACITY MEASURE: CPT | Mod: S$GLB,,, | Performed by: UROLOGY

## 2024-07-22 PROCEDURE — 99204 OFFICE O/P NEW MOD 45 MIN: CPT | Mod: S$GLB,,, | Performed by: UROLOGY

## 2024-07-22 PROCEDURE — 3008F BODY MASS INDEX DOCD: CPT | Mod: CPTII,S$GLB,, | Performed by: UROLOGY

## 2024-07-22 PROCEDURE — 3051F HG A1C>EQUAL 7.0%<8.0%: CPT | Mod: CPTII,S$GLB,, | Performed by: UROLOGY

## 2024-07-22 PROCEDURE — 81003 URINALYSIS AUTO W/O SCOPE: CPT | Mod: QW,S$GLB,, | Performed by: UROLOGY

## 2024-07-22 RX ORDER — MIRABEGRON 50 MG/1
50 TABLET, EXTENDED RELEASE ORAL DAILY
Qty: 30 TABLET | Refills: 6 | Status: SHIPPED | OUTPATIENT
Start: 2024-07-22 | End: 2025-07-22

## 2024-07-22 RX ORDER — MIRABEGRON 50 MG/1
50 TABLET, EXTENDED RELEASE ORAL DAILY
Qty: 30 TABLET | Refills: 3 | Status: SHIPPED | OUTPATIENT
Start: 2024-07-22 | End: 2024-07-22

## 2024-07-22 NOTE — PROGRESS NOTES
Chief Complaint   Patient presents with    Initial Visit     Interstitial cystitis       History of Present Illness:   Leni Dumont is a 34 y.o. female here for evaluation of Initial Visit (Interstitial cystitis)  .   [birth year]33yo female with DM, here for evaluation of IC. She has seen Dr. Roberts in the past and per his records, she has had a hydrodistension. Pt reports that this didn't improve her symptoms. Elmiron has worked, but caused visual issues. She has also previously tried Urimar T and gemtesa, which didn't help at all.   Currently, she is taking Azo pills when she has flare ups. She avoids dietary triggers, which are primarily fruits and vegetables.   She reports that she also has occasional UTIs/pyelonephritis.   She reports that her diabetic control has recently improved.   She reports that she has UUI about 3 times a week.   Only drinks water for the most-part.       Review of Systems   Constitutional:  Negative for chills and fever.   Respiratory:  Positive for shortness of breath (frequent asthma attacks).    Cardiovascular:  Positive for leg swelling. Negative for chest pain.   All other systems reviewed and are negative.        Past Medical History:   Diagnosis Date    Asthma     Diabetes mellitus     Heart disease     Hyperlipidemia     Interstitial cystitis     Varicose veins of left leg with edema        Past Surgical History:   Procedure Laterality Date    CHOLECYSTECTOMY      CYSTOSCOPY WITH URETHRAL DILATION      GANGLION CYST EXCISION      LEG SURGERY Right     PRE-MALIGNANT / BENIGN SKIN LESION EXCISION      WISDOM TOOTH EXTRACTION         Family History   Problem Relation Name Age of Onset    Diabetes Other      Hyperlipidemia Other      Heart disease Other         Social History     Tobacco Use    Smoking status: Never    Smokeless tobacco: Never   Substance Use Topics    Alcohol use: No    Drug use: No       Current Outpatient Medications   Medication Sig Dispense Refill    albuterol  (VENTOLIN HFA) 90 mcg/actuation inhaler Inhale 2 puffs into the lungs every 6 (six) hours as needed.      blood-glucose meter (TRUE METRIX GLUCOSE METER) Misc Use as directed to check blood sugar      dexAMETHasone (DECADRON) 6 MG tablet TAKE 2 TABLET BY MOUTH DAILY FOR 2 DAYS THEN 1 ONCE DAILY FOR 5 DAYS      DULERA 100-5 mcg/actuation HFAA Inhale 1 puff into the lungs 2 (two) times daily.      FLOVENT  mcg/actuation inhaler Inhale 2 puffs into the lungs 2 (two) times daily.      fluvoxaMINE (LUVOX) 100 MG tablet Take 100 mg by mouth.      FREESTYLE SWATI 3 SENSOR Jigna Change sensor every 14 days 2 each 11    furosemide (LASIX) 20 MG tablet Take 20 mg by mouth once daily.      glycopyrrolate (ROBINUL) 1 mg Tab Take 1 mg by mouth.      hydroCHLOROthiazide (HYDRODIURIL) 25 MG tablet Take 25 mg by mouth once daily.      hydrocortisone 2.5 % cream Apply topically 2 (two) times daily as needed.      hydrOXYzine HCL (ATARAX) 25 MG tablet       ketoconazole (NIZORAL) 2 % cream Apply topically 2 (two) times daily as needed (RASH IN CREASES).      levoFLOXacin (LEVAQUIN) 500 MG tablet Take 500 mg by mouth every morning.      mirabegron (MYRBETRIQ) 50 mg Tb24 Take 1 tablet (50 mg total) by mouth Daily. 30 tablet 6    montelukast (SINGULAIR) 10 mg tablet Take 10 mg by mouth every evening.      norethindrone-ethinyl estradiol-iron (FERNANDO FE 1.5/30, 28,) 1.5 mg-30 mcg (21)/75 mg (7) tablet Take 1 tablet by mouth once daily. 28 tablet 11    NOVOLOG FLEXPEN U-100 INSULIN 100 unit/mL (3 mL) InPn pen INJECT 50 UNITS SUBCUTANEOUSLY THREE TIMES DAILY WITH MEALS 45 mL 1    ondansetron (ZOFRAN-ODT) 4 MG TbDL Take 1 tablet (4 mg total) by mouth every 8 (eight) hours as needed (nausea). 30 tablet 0    phenazopyridine (PYRIDIUM) 200 MG tablet Take 200 mg by mouth 3 (three) times daily with meals. As needed for pain      pramoxine-hydrocortisone cream Apply topically 2 (two) times daily as needed (itching).      repaglinide  (PRANDIN) 2 MG tablet TAKE 1 TABLET BY MOUTH THREE TIMES DAILY BEFORE MEAL(S)      rosuvastatin (CRESTOR) 10 MG tablet Take 1 tablet by mouth once daily 90 tablet 1    tirzepatide 2.5 mg/0.5 mL PnIj Inject 2.5 mg into the skin every 7 days.      TRESIBA FLEXTOUCH U-200 200 unit/mL (3 mL) insulin pen INJECT 150 UNITS INTO THE SKIN ONCE DAILY. DOSE INCREASE PLEASE ALLOW EARLY REFILL 9 pen 5     No current facility-administered medications for this visit.       Review of patient's allergies indicates:   Allergen Reactions    Dextromethorphan     Glipizide     Dextromethorphan-guaifenesin     Dicyclomine     Doxycycline     Erythromycin     Exefen [pseudoephedrine-guaifenesin]     Medrol [methylprednisolone]     Phenylephrine        Physical Exam  Vitals:    07/22/24 0934   Resp: 18     General: Well-developed, well-nourished, in no acute distress  HEENT: Normocephalic, atraumatic, extraocular movements intact  Neck: Supple, no supraclavicular or cervical lymphadenopathy, trachea midline  Respirations: even and unlabored  Back: midline spine, No CVA tenderness  Abdomen: soft, Non-tender, non-distended, no palpable masses, no rebound or guarding  Extremities: moves all equally, no clubbing, cyanosis or edema  Skin: Warm and dry. No lesions  Psych: normal affect  Neuro: Alert and oriented x 3. Cranial nerves II-XII intact    PVR: 202cc    Urinalysis  Negative for blood, LE, nit      Assessment:  1. Interstitial cystitis  Ambulatory referral/consult to Urology      2. OAB (overactive bladder)              Plan:   Interstitial cystitis  -     Ambulatory referral/consult to Urology    OAB (overactive bladder)    Other orders  -     Discontinue: mirabegron (MYRBETRIQ) 50 mg Tb24; Take 1 tablet (50 mg total) by mouth Daily.  Dispense: 30 tablet; Refill: 3  -     mirabegron (MYRBETRIQ) 50 mg Tb24; Take 1 tablet (50 mg total) by mouth Daily.  Dispense: 30 tablet; Refill: 6    Continue IC diet    Follow up in about 6 months  (around 1/22/2025).

## 2024-08-06 ENCOUNTER — OFFICE VISIT (OUTPATIENT)
Dept: PULMONOLOGY | Facility: CLINIC | Age: 34
End: 2024-08-06
Payer: COMMERCIAL

## 2024-08-06 VITALS
HEART RATE: 93 BPM | HEIGHT: 64 IN | WEIGHT: 278.44 LBS | SYSTOLIC BLOOD PRESSURE: 130 MMHG | BODY MASS INDEX: 47.54 KG/M2 | DIASTOLIC BLOOD PRESSURE: 88 MMHG

## 2024-08-06 DIAGNOSIS — J45.909 ASTHMA, UNSPECIFIED ASTHMA SEVERITY, UNSPECIFIED WHETHER COMPLICATED, UNSPECIFIED WHETHER PERSISTENT: ICD-10-CM

## 2024-08-06 PROCEDURE — 99999 PR PBB SHADOW E&M-EST. PATIENT-LVL V: CPT | Mod: PBBFAC,,, | Performed by: INTERNAL MEDICINE

## 2024-08-06 PROCEDURE — 3051F HG A1C>EQUAL 7.0%<8.0%: CPT | Mod: CPTII,S$GLB,, | Performed by: INTERNAL MEDICINE

## 2024-08-06 PROCEDURE — 3075F SYST BP GE 130 - 139MM HG: CPT | Mod: CPTII,S$GLB,, | Performed by: INTERNAL MEDICINE

## 2024-08-06 PROCEDURE — 3008F BODY MASS INDEX DOCD: CPT | Mod: CPTII,S$GLB,, | Performed by: INTERNAL MEDICINE

## 2024-08-06 PROCEDURE — 1159F MED LIST DOCD IN RCRD: CPT | Mod: CPTII,S$GLB,, | Performed by: INTERNAL MEDICINE

## 2024-08-06 PROCEDURE — 1160F RVW MEDS BY RX/DR IN RCRD: CPT | Mod: CPTII,S$GLB,, | Performed by: INTERNAL MEDICINE

## 2024-08-06 PROCEDURE — 3079F DIAST BP 80-89 MM HG: CPT | Mod: CPTII,S$GLB,, | Performed by: INTERNAL MEDICINE

## 2024-08-06 PROCEDURE — 99204 OFFICE O/P NEW MOD 45 MIN: CPT | Mod: S$GLB,,, | Performed by: INTERNAL MEDICINE

## 2024-08-07 ENCOUNTER — TELEPHONE (OUTPATIENT)
Dept: PULMONOLOGY | Facility: CLINIC | Age: 34
End: 2024-08-07
Payer: COMMERCIAL

## 2024-08-08 ENCOUNTER — TELEPHONE (OUTPATIENT)
Dept: PULMONOLOGY | Facility: CLINIC | Age: 34
End: 2024-08-08
Payer: COMMERCIAL

## 2024-08-21 ENCOUNTER — TELEPHONE (OUTPATIENT)
Dept: PULMONOLOGY | Facility: CLINIC | Age: 34
End: 2024-08-21
Payer: COMMERCIAL

## 2024-08-21 NOTE — TELEPHONE ENCOUNTER
----- Message from Yuli Mcdermott sent at 8/21/2024  3:05 PM CDT -----  Regarding: Pt Advice  Contact: [phone]  Pt calling regarding call received for questions about testing she is going to have done. States she didn't have a voice at the time of the call. She is now able to talk and request ca call back [phone]

## 2024-08-30 ENCOUNTER — CLINICAL SUPPORT (OUTPATIENT)
Dept: PULMONOLOGY | Facility: CLINIC | Age: 34
End: 2024-08-30
Payer: COMMERCIAL

## 2024-08-30 DIAGNOSIS — J45.909 ASTHMA, UNSPECIFIED ASTHMA SEVERITY, UNSPECIFIED WHETHER COMPLICATED, UNSPECIFIED WHETHER PERSISTENT: ICD-10-CM

## 2024-08-30 LAB
FEF 25 75 CHG: 1.6 %
FEF 25 75 LLN: 2.4
FEF 25 75 POST REF: 111.9 %
FEF 25 75 PRE REF: 110.1 %
FEF 25 75 REF: 3.8
FET100 CHG: 48.8 %
FEV1 CHG: 6.2 %
FEV1 FVC CHG: 2.2 %
FEV1 FVC LLN: 72
FEV1 FVC POST REF: 109.7 %
FEV1 FVC PRE REF: 107.3 %
FEV1 FVC REF: 84
FEV1 LLN: 2.53
FEV1 POST REF: 77.1 %
FEV1 PRE REF: 72.6 %
FEV1 REF: 3.15
FVC CHG: 3.9 %
FVC LLN: 3.03
FVC POST REF: 69.9 %
FVC PRE REF: 67.3 %
FVC REF: 3.79
PEF CHG: -20.4 %
PEF LLN: 5.32
PEF POST REF: 74.2 %
PEF PRE REF: 93.3 %
PEF REF: 7.04
POST FEF 25 75: 4.25 L/S
POST FET 100: 5.76 SEC
POST FEV1 FVC: 91.84 %
POST FEV1: 2.43 L
POST FVC: 2.65 L
POST PEF: 5.22 L/S
PRE FEF 25 75: 4.18 L/S
PRE FET 100: 3.87 SEC
PRE FEV1 FVC: 89.87 %
PRE FEV1: 2.29 L
PRE FVC: 2.55 L
PRE PEF: 6.56 L/S

## 2024-08-30 PROCEDURE — 99999 PR PBB SHADOW E&M-EST. PATIENT-LVL I: CPT | Mod: PBBFAC,,,

## 2024-08-30 NOTE — PROGRESS NOTES
See Completed Spirometry.   Methacholine challenge appt confirmed. Methacholine test instructions reviewed with pt with verbal understanding.

## 2024-09-03 DIAGNOSIS — Z79.4 UNCONTROLLED TYPE 2 DIABETES MELLITUS WITH HYPERGLYCEMIA, WITH LONG-TERM CURRENT USE OF INSULIN: ICD-10-CM

## 2024-09-03 DIAGNOSIS — E11.65 UNCONTROLLED TYPE 2 DIABETES MELLITUS WITH HYPERGLYCEMIA, WITH LONG-TERM CURRENT USE OF INSULIN: ICD-10-CM

## 2024-09-03 RX ORDER — MIRABEGRON 50 MG/1
50 TABLET, EXTENDED RELEASE ORAL DAILY
Qty: 30 TABLET | Refills: 6 | Status: SHIPPED | OUTPATIENT
Start: 2024-09-03 | End: 2025-09-03

## 2024-09-04 ENCOUNTER — CLINICAL SUPPORT (OUTPATIENT)
Dept: PULMONOLOGY | Facility: CLINIC | Age: 34
End: 2024-09-04
Payer: COMMERCIAL

## 2024-09-04 ENCOUNTER — OFFICE VISIT (OUTPATIENT)
Dept: PULMONOLOGY | Facility: CLINIC | Age: 34
End: 2024-09-04
Payer: COMMERCIAL

## 2024-09-04 VITALS
OXYGEN SATURATION: 95 % | OXYGEN SATURATION: 99 % | DIASTOLIC BLOOD PRESSURE: 80 MMHG | HEART RATE: 96 BPM | RESPIRATION RATE: 20 BRPM | SYSTOLIC BLOOD PRESSURE: 130 MMHG | HEIGHT: 64 IN | BODY MASS INDEX: 46.67 KG/M2 | WEIGHT: 273.38 LBS | RESPIRATION RATE: 21 BRPM | HEART RATE: 115 BPM

## 2024-09-04 DIAGNOSIS — J38.3 VOCAL CORD DYSFUNCTION: Primary | Chronic | ICD-10-CM

## 2024-09-04 DIAGNOSIS — J45.909 ASTHMA, UNSPECIFIED ASTHMA SEVERITY, UNSPECIFIED WHETHER COMPLICATED, UNSPECIFIED WHETHER PERSISTENT: Primary | ICD-10-CM

## 2024-09-04 LAB — BRMETHACHOLINE: NORMAL

## 2024-09-04 PROCEDURE — 99999 PR PBB SHADOW E&M-EST. PATIENT-LVL V: CPT | Mod: PBBFAC,,,

## 2024-09-04 PROCEDURE — 99999 PR PBB SHADOW E&M-EST. PATIENT-LVL V: CPT | Mod: PBBFAC,,, | Performed by: INTERNAL MEDICINE

## 2024-09-04 RX ORDER — METHACHOLINE CHLORIDE 0.75/3ML
3 VIAL, NEBULIZER (ML) INHALATION ONCE
Status: COMPLETED | OUTPATIENT
Start: 2024-09-04 | End: 2024-09-04

## 2024-09-04 RX ORDER — METHACHOLINE CHLORIDE 48 MG/3 ML
3 VIAL, NEBULIZER (ML) INHALATION ONCE
Status: COMPLETED | OUTPATIENT
Start: 2024-09-04 | End: 2024-09-04

## 2024-09-04 RX ORDER — METHACHOLINE CHLORIDE 12 MG/3 ML
3 VIAL, NEBULIZER (ML) INHALATION ONCE
Status: COMPLETED | OUTPATIENT
Start: 2024-09-04 | End: 2024-09-04

## 2024-09-04 RX ORDER — METHACHOLINE CHLORIDE 3 MG/3 ML
3 VIAL, NEBULIZER (ML) INHALATION ONCE
Status: COMPLETED | OUTPATIENT
Start: 2024-09-04 | End: 2024-09-04

## 2024-09-04 RX ORDER — FLUCONAZOLE 150 MG/1
1 TABLET ORAL EVERY MORNING
COMMUNITY
Start: 2024-08-30

## 2024-09-04 RX ORDER — METHACHOLINE CHLORIDE 0 MG/3 ML
3 VIAL, NEBULIZER (ML) INHALATION ONCE
Status: COMPLETED | OUTPATIENT
Start: 2024-09-04 | End: 2024-09-04

## 2024-09-04 RX ORDER — METHACHOLINE CHLORIDE 0.1875/3ML
3 VIAL, NEBULIZER (ML) INHALATION ONCE
Status: COMPLETED | OUTPATIENT
Start: 2024-09-04 | End: 2024-09-04

## 2024-09-04 RX ORDER — INSULIN DEGLUDEC 200 U/ML
110 INJECTION, SOLUTION SUBCUTANEOUS DAILY
Qty: 6 PEN | Refills: 5 | Status: SHIPPED | OUTPATIENT
Start: 2024-09-04

## 2024-09-04 RX ADMIN — Medication 0.75 MG: at 10:09

## 2024-09-04 RX ADMIN — Medication 12 MG: at 10:09

## 2024-09-04 RX ADMIN — Medication 3 ML: at 10:09

## 2024-09-04 RX ADMIN — Medication 0.19 MG: at 10:09

## 2024-09-04 RX ADMIN — Medication 48 MG: at 10:09

## 2024-09-04 RX ADMIN — Medication 3 MG: at 10:09

## 2024-09-04 NOTE — PROGRESS NOTES
Methacholine Challenge Pre-Test Questionnaire:    List all medications you have taken in the last 3 days for asthma, hay fever, heart disease, blood pressure, allergies, or stomach problems, and the number of hours or days since your last dose for each medication.    Name of Medication Date and time of last treatment   LASIX 09/01/2024                       Blood pressure: 147/81 Heart rate: 99     1. Has a physician told you that you have asthma? Yes [x] No []   2. Have you ever been hospitalized for asthma? Yes []  No [x]   3. Did you have recurrent episodes of cough and wheezing or lung infections as a child? Yes [x] No []   4. Have you experienced asthma symptoms such as wheezing or shortness of breath in the last two weeks? Yes [] No [x]   5. If you are a smoker, when did you last smoke?  Yes [] No [x]   6. Have you had a respiratory infection in the last 6 weeks?   Yes []  No [x]   7. Have you had a heart attack or stroke within the last 3 months?   Yes [] No [x]   8. Do you have high blood pressure?  Yes [x] No []   9. Do you have an aortic aneurysm?  Yes [] No [x]   10. Have you had recent eye surgery?  Yes [] No [x]   11. Are you pregnant or nursing?  Yes [] No [x]

## 2024-09-04 NOTE — PROGRESS NOTES
Subjective:      Patient ID: Leni Dumont is a 34 y.o. female.    Chief Complaint: Asthma    Asthma  Her past medical history is significant for asthma.       Aug 2024  34-year-old female here with complaints of asthma. Patient does not know if she is seen a pulmonologist previously but certainly has not seen 1 here. She also has obesity and chronic interstitial cystitis. Patient states that eating healthy food causes painful urination and therefore she has sepsis that on unhealthy foods to avoid this symptom. She is followed chronically by Urology. She has never been hospitalized or intubated for her asthma symptoms. She takes Dulera b.i.d. and Flovent b.i.d. with as needed albuterol. Patient states that nonspecific airway irritants such as perfumes, vapors and chemicals cause her wheezing and dysphonia which can last up today's. She has taken steroids in the past but she says that this may actually makes her asthma worse. She has been on Singulair in the past but she also states that this makes her asthma worse. She had allergy testing done in January which was overall negative. She is here today with her mother.     Aug 2024  Here for follow up of the above  Methacholine challenge done today  She did have a 20% decrease in FEV1 but it was at the highest concentration indicating no evidence for airway hyperreactivity/no evidence for asthma at this time  Otherwise her spirometry just showed some mild restriction    Review of Systems as per history of present illness otherwise negative  Objective:     Physical Exam   Constitutional: She is oriented to person, place, and time. She appears well-developed. No distress. She is obese.   HENT:   Head: Normocephalic.   Cardiovascular: Normal rate and regular rhythm.   Pulmonary/Chest: Normal expansion, symmetric chest wall expansion, effort normal and breath sounds normal.   Musculoskeletal:      Cervical back: Neck supple.   Neurological: She is alert and oriented to  "person, place, and time.   Psychiatric: She has a normal mood and affect.   Nursing note and vitals reviewed.          9/4/2024    11:24 AM 9/4/2024    10:50 AM 9/4/2024    10:44 AM 9/4/2024    10:39 AM 9/4/2024    10:34 AM 9/4/2024    10:29 AM 9/4/2024    10:23 AM   Pulmonary Function Tests   SpO2 95 % 99 % 97 % 98 % 97 % 96 % 98 %   Height 5' 4" (1.626 m)         Weight 124 kg (273 lb 6.4 oz)         BMI (Calculated) 46.9              Assessment:     1. Vocal cord dysfunction        Plan:     No current evidence for airway hyperreactivity/asthma based on methacholine testing today  Symptoms much more likely related to vocal cord dysfunction  Recommended she seek additional consultation and treatment at [facility]   Inhaled bronchodilators not likely to be of much help  No evidence for intrinsic lung disease otherwise  Return in p.r.n.    "

## 2024-10-01 ENCOUNTER — LAB VISIT (OUTPATIENT)
Dept: LAB | Facility: HOSPITAL | Age: 34
End: 2024-10-01
Attending: NURSE PRACTITIONER
Payer: COMMERCIAL

## 2024-10-01 ENCOUNTER — OFFICE VISIT (OUTPATIENT)
Dept: DIABETES | Facility: CLINIC | Age: 34
End: 2024-10-01
Payer: COMMERCIAL

## 2024-10-01 VITALS
HEART RATE: 84 BPM | BODY MASS INDEX: 46.48 KG/M2 | HEIGHT: 64 IN | DIASTOLIC BLOOD PRESSURE: 84 MMHG | SYSTOLIC BLOOD PRESSURE: 121 MMHG | WEIGHT: 272.25 LBS

## 2024-10-01 DIAGNOSIS — N30.10 INTERSTITIAL CYSTITIS: ICD-10-CM

## 2024-10-01 DIAGNOSIS — E11.29 PERSISTENT MICROALBUMINURIA ASSOCIATED WITH TYPE 2 DIABETES MELLITUS: ICD-10-CM

## 2024-10-01 DIAGNOSIS — E78.5 HYPERLIPIDEMIA ASSOCIATED WITH TYPE 2 DIABETES MELLITUS: ICD-10-CM

## 2024-10-01 DIAGNOSIS — Z79.4 UNCONTROLLED TYPE 2 DIABETES MELLITUS WITH HYPERGLYCEMIA, WITH LONG-TERM CURRENT USE OF INSULIN: Primary | ICD-10-CM

## 2024-10-01 DIAGNOSIS — R35.89 POLYURIA: ICD-10-CM

## 2024-10-01 DIAGNOSIS — E55.9 VITAMIN D DEFICIENCY: ICD-10-CM

## 2024-10-01 DIAGNOSIS — E11.65 UNCONTROLLED TYPE 2 DIABETES MELLITUS WITH HYPERGLYCEMIA, WITH LONG-TERM CURRENT USE OF INSULIN: ICD-10-CM

## 2024-10-01 DIAGNOSIS — R80.9 PERSISTENT MICROALBUMINURIA ASSOCIATED WITH TYPE 2 DIABETES MELLITUS: ICD-10-CM

## 2024-10-01 DIAGNOSIS — E11.65 UNCONTROLLED TYPE 2 DIABETES MELLITUS WITH HYPERGLYCEMIA, WITH LONG-TERM CURRENT USE OF INSULIN: Primary | ICD-10-CM

## 2024-10-01 DIAGNOSIS — Z96.41 INSULIN PUMP IN PLACE: ICD-10-CM

## 2024-10-01 DIAGNOSIS — I10 ESSENTIAL HYPERTENSION: ICD-10-CM

## 2024-10-01 DIAGNOSIS — E11.42 DIABETIC PERIPHERAL NEUROPATHY ASSOCIATED WITH TYPE 2 DIABETES MELLITUS: ICD-10-CM

## 2024-10-01 DIAGNOSIS — E11.69 HYPERLIPIDEMIA ASSOCIATED WITH TYPE 2 DIABETES MELLITUS: ICD-10-CM

## 2024-10-01 DIAGNOSIS — Z79.4 UNCONTROLLED TYPE 2 DIABETES MELLITUS WITH HYPERGLYCEMIA, WITH LONG-TERM CURRENT USE OF INSULIN: ICD-10-CM

## 2024-10-01 LAB
ALBUMIN SERPL BCP-MCNC: 4 G/DL (ref 3.5–5.2)
ALP SERPL-CCNC: 77 U/L (ref 55–135)
ALT SERPL W/O P-5'-P-CCNC: 49 U/L (ref 10–44)
ANION GAP SERPL CALC-SCNC: 8 MMOL/L (ref 8–16)
AST SERPL-CCNC: 35 U/L (ref 10–40)
BILIRUB SERPL-MCNC: 0.7 MG/DL (ref 0.1–1)
BUN SERPL-MCNC: 9 MG/DL (ref 6–20)
CALCIUM SERPL-MCNC: 9.6 MG/DL (ref 8.7–10.5)
CHLORIDE SERPL-SCNC: 104 MMOL/L (ref 95–110)
CHOLEST SERPL-MCNC: 228 MG/DL (ref 120–199)
CHOLEST/HDLC SERPL: 5.7 {RATIO} (ref 2–5)
CO2 SERPL-SCNC: 24 MMOL/L (ref 23–29)
CREAT SERPL-MCNC: 0.8 MG/DL (ref 0.5–1.4)
EST. GFR  (NO RACE VARIABLE): >60 ML/MIN/1.73 M^2
GLUCOSE SERPL-MCNC: 146 MG/DL (ref 70–110)
GLUCOSE SERPL-MCNC: 152 MG/DL (ref 70–110)
HDLC SERPL-MCNC: 40 MG/DL (ref 40–75)
HDLC SERPL: 17.5 % (ref 20–50)
LDLC SERPL CALC-MCNC: 154.6 MG/DL (ref 63–159)
NONHDLC SERPL-MCNC: 188 MG/DL
POTASSIUM SERPL-SCNC: 4.4 MMOL/L (ref 3.5–5.1)
PROT SERPL-MCNC: 7.6 G/DL (ref 6–8.4)
SODIUM SERPL-SCNC: 136 MMOL/L (ref 136–145)
TRIGL SERPL-MCNC: 167 MG/DL (ref 30–150)

## 2024-10-01 PROCEDURE — 1160F RVW MEDS BY RX/DR IN RCRD: CPT | Mod: CPTII,S$GLB,, | Performed by: NURSE PRACTITIONER

## 2024-10-01 PROCEDURE — 82570 ASSAY OF URINE CREATININE: CPT | Performed by: NURSE PRACTITIONER

## 2024-10-01 PROCEDURE — 82043 UR ALBUMIN QUANTITATIVE: CPT | Performed by: NURSE PRACTITIONER

## 2024-10-01 PROCEDURE — 95251 CONT GLUC MNTR ANALYSIS I&R: CPT | Mod: S$GLB,,, | Performed by: NURSE PRACTITIONER

## 2024-10-01 PROCEDURE — 84443 ASSAY THYROID STIM HORMONE: CPT | Performed by: NURSE PRACTITIONER

## 2024-10-01 PROCEDURE — G2211 COMPLEX E/M VISIT ADD ON: HCPCS | Mod: S$GLB,,, | Performed by: NURSE PRACTITIONER

## 2024-10-01 PROCEDURE — 99999 PR PBB SHADOW E&M-EST. PATIENT-LVL V: CPT | Mod: PBBFAC,,, | Performed by: NURSE PRACTITIONER

## 2024-10-01 PROCEDURE — 83036 HEMOGLOBIN GLYCOSYLATED A1C: CPT | Performed by: NURSE PRACTITIONER

## 2024-10-01 PROCEDURE — 3079F DIAST BP 80-89 MM HG: CPT | Mod: CPTII,S$GLB,, | Performed by: NURSE PRACTITIONER

## 2024-10-01 PROCEDURE — 3008F BODY MASS INDEX DOCD: CPT | Mod: CPTII,S$GLB,, | Performed by: NURSE PRACTITIONER

## 2024-10-01 PROCEDURE — 82962 GLUCOSE BLOOD TEST: CPT | Mod: S$GLB,,, | Performed by: NURSE PRACTITIONER

## 2024-10-01 PROCEDURE — 80053 COMPREHEN METABOLIC PANEL: CPT | Performed by: NURSE PRACTITIONER

## 2024-10-01 PROCEDURE — 80061 LIPID PANEL: CPT | Performed by: NURSE PRACTITIONER

## 2024-10-01 PROCEDURE — 99214 OFFICE O/P EST MOD 30 MIN: CPT | Mod: S$GLB,,, | Performed by: NURSE PRACTITIONER

## 2024-10-01 PROCEDURE — 3074F SYST BP LT 130 MM HG: CPT | Mod: CPTII,S$GLB,, | Performed by: NURSE PRACTITIONER

## 2024-10-01 PROCEDURE — 1159F MED LIST DOCD IN RCRD: CPT | Mod: CPTII,S$GLB,, | Performed by: NURSE PRACTITIONER

## 2024-10-01 PROCEDURE — 36415 COLL VENOUS BLD VENIPUNCTURE: CPT | Performed by: NURSE PRACTITIONER

## 2024-10-01 PROCEDURE — 3051F HG A1C>EQUAL 7.0%<8.0%: CPT | Mod: CPTII,S$GLB,, | Performed by: NURSE PRACTITIONER

## 2024-10-01 RX ORDER — BLOOD-GLUCOSE SENSOR
EACH MISCELLANEOUS
Qty: 2 EACH | Refills: 11 | Status: SHIPPED | OUTPATIENT
Start: 2024-10-01 | End: 2024-10-02 | Stop reason: RX

## 2024-10-01 RX ORDER — INSULIN ASPART 100 [IU]/ML
50 INJECTION, SOLUTION INTRAVENOUS; SUBCUTANEOUS
Qty: 45 ML | Refills: 5 | Status: SHIPPED | OUTPATIENT
Start: 2024-10-01

## 2024-10-01 RX ORDER — INSULIN DEGLUDEC 200 U/ML
110 INJECTION, SOLUTION SUBCUTANEOUS DAILY
Qty: 6 PEN | Refills: 5 | Status: SHIPPED | OUTPATIENT
Start: 2024-10-01

## 2024-10-01 RX ORDER — INSULIN HUMAN 500 [IU]/ML
INJECTION, SOLUTION SUBCUTANEOUS
Qty: 4 PEN | Refills: 5 | Status: SHIPPED | OUTPATIENT
Start: 2024-10-01

## 2024-10-01 NOTE — PROGRESS NOTES
Subjective:         Patient ID: Leni Dumont is a 34 y.o. female.  Patient's current PCP is Brian Darby MD.     Chief Complaint: Diabetes Mellitus    HPI  Leni Dumont is a 34 y.o. White female presenting for a follow up for diabetes. Patient has been diagnosed with type 2 diabetes since age 17 .  Received diabetes education: Yes-[facility], 12/2023    CURRENT DM MEDICATIONS:   Diabetes Medications               NOVOLOG FLEXPEN U-100 INSULIN 100 unit/mL (3 mL) InPn pen INJECT 50 UNITS SUBCUTANEOUSLY THREE TIMES DAILY WITH MEALS    repaglinide (PRANDIN) 2 MG tablet TAKE 1 TABLET BY MOUTH THREE TIMES DAILY BEFORE MEAL(S)    tirzepatide 2.5 mg/0.5 mL PnIj Inject 2.5 mg into the skin every 7 days.    TRESIBA FLEXTOUCH U-200 200 unit/mL (3 mL) insulin pen INJECT 150 UNITS INTO THE SKIN ONCE DAILY. DOSE INCREASE PLEASE ALLOW EARLY REFILL 100 units     Past failed treatment include: Metformin - significant diarrhea; Trulicity - diarrhea/bladder pain; Prandin,Byetta,Glipizide- diarrhea. Lantus/Basaglar and Ozempic- failure to control diabetes; Tresiba and Novolog-failure to control diabetes;  Does not want doses of Mounjaro higher than 2.5 due to constipation    Blood glucose testing Continuous per Kal 3   Preferred lab: [facility]    Any episodes of hypoglycemia? 0% per Kal    Complications related to diabetes: peripheral neuropathy and microalbuminuria    Her blood sugar in the clinic today was:   Lab Results   Component Value Date    POCGLU 152 (A) 10/01/2024     Leni Dumont presents today for follow up visit to discuss diabetes management. Due for lab work.    Per Kal download, for the last 14 days:Average glucose of 182 mg/dL. She is only 51% in range. 41% of readings are high, with 8% of readings > 250. 0% hypoglycemia. Pattern of postprandial hyperglycemia. Unable to have dose of Mounjaro increased due to constipation. Based on review, will stop MDI and move forward with U500. We discussed MOA of U500 and  importance of not skipping meals - she only eats twice a day typically. We discussed keeping carbs consistent and hypoglycemia risk. We discussed not starting U500 until speaking with me so we can determine when appropriate to start.        Neuropathy- Followed by Dr. Linda.     Interstitial cystitis- now seeing Dr. Nita Kearns.      Current diet: Limited: Mainly carbs and meats. Can eat fresh green beans and carrots. Cannot eat wheat pasta. Eats a lot of chicken, tortillas, cheese. Can only drink Dasani water.     Activity Level:     Lab Results   Component Value Date    HGBA1C 7.9 (H) 06/26/2024    HGBA1C 10.9 (H) 11/28/2023    HGBA1C 10.5 (H) 04/11/2023     STANDARDS OF CARE  Diabetes Management Status    Statin: Taking  ACE/ARB: Not taking    Screening or Prevention Patient's value Goal Complete/Controlled?   HgA1C Testing and Control   Lab Results   Component Value Date    HGBA1C 7.9 (H) 06/26/2024      Annually/Less than 8% Yes   Lipid profile : 06/26/2024 Annually Yes   LDL control Lab Results   Component Value Date    LDLCALC 179.8 (H) 06/26/2024    Annually/Less than 100 mg/dl  No   Nephropathy screening Lab Results   Component Value Date    LABMICR 241.0 12/06/2023     Lab Results   Component Value Date    PROTEINUA Negative 09/29/2014     Lab Results   Component Value Date    UTPCR 0.05 06/11/2008      Annually Yes   Blood pressure BP Readings from Last 1 Encounters:   10/01/24 121/84    Less than 140/90 Yes   Dilated retinal exam : 05/28/2024 Annually Yes   Foot exam   Most Recent Foot Exam Date: Not Found Annually No      Labs reviewed and are noted below.    Lab Results   Component Value Date    WBC 11.25 09/29/2014    HGB 13.4 09/29/2014    HCT 41.4 09/29/2014     (H) 09/29/2014    CHOL 251 (H) 06/26/2024    TRIG 161 (H) 06/26/2024    HDL 39 (L) 06/26/2024    LDLCALC 179.8 (H) 06/26/2024    ALT 68 (H) 09/29/2014    AST 54 (H) 09/29/2014     (L) 11/28/2023    K 4.6 11/28/2023    CL 98  11/28/2023    ANIONGAP 11 11/28/2023    CREATININE 0.7 11/28/2023    ESTGFRAFRICA 144 12/11/2020    EGFRNONAA >60 09/29/2014    BUN 9 11/28/2023    CO2 22 (L) 11/28/2023    TSH 1.398 11/28/2023     (H) 11/28/2023    UTPCR 0.05 06/11/2008     Lab Results   Component Value Date    GLUTAMICACID 0.00 11/28/2023    CPEPTIDE 4.97 11/28/2023    TSH 1.398 11/28/2023    AQCCYPSW28 759 06/30/2023    CALCIUM 9.9 11/28/2023    PHOS 4.2 06/11/2008     Lab Results   Component Value Date    CPEPTIDE 4.97 11/28/2023     Lab Results   Component Value Date    GLUTAMICACID 0.00 11/28/2023     Glucose   Date Value Ref Range Status   11/28/2023 251 (H) 70 - 110 mg/dL Final     Anion Gap   Date Value Ref Range Status   11/28/2023 11 8 - 16 mmol/L Final     eGFR if    Date Value Ref Range Status   09/29/2014 >60 >60 mL/min/1.73 m^2 Final     eGFR    Date Value Ref Range Status   12/11/2020 144  Final     eGFR if non    Date Value Ref Range Status   09/29/2014 >60 >60 mL/min/1.73 m^2 Final     Comment:     Calculation used to obtain the estimated glomerular filtration  rate (eGFR) is the CKD-EPI equation. Since race is unknown   in our information system, the eGFR values for   -American and Non--American patients are given   for each creatinine result.         The following portions of the patient's history were reviewed and updated as appropriate: allergies, current medications, past family history, past medical history, past social history, past surgical history, and problem list.    Review of patient's allergies indicates:   Allergen Reactions    Dextromethorphan     Glipizide     Dextromethorphan-guaifenesin     Dicyclomine     Doxycycline     Erythromycin     Exefen [pseudoephedrine-guaifenesin]     Medrol [methylprednisolone]     Phenylephrine      Social History     Socioeconomic History    Marital status: Single   Tobacco Use    Smoking status: Never    Smokeless  tobacco: Never   Substance and Sexual Activity    Alcohol use: No    Drug use: No    Sexual activity: Yes     Past Medical History:   Diagnosis Date    Asthma     Diabetes mellitus     Heart disease     Hyperlipidemia     Interstitial cystitis     Varicose veins of left leg with edema     Vocal cord dysfunction 09/04/2024       REVIEW OF SYSTEMS:  Eyes No history of DR.  Cardiovascular: History of HTN and HLD.   GI: No history of pancreatitis or gastroparesis.  :H/o interstitial cystitis. Positive microalbuminuria.   Neuro: Positive neuropathy.  PSYCH: No tobacco use.  ENDO: See HPI.        Objective:      Vitals:    10/01/24 1256   BP: 121/84   Pulse: 84     RESPIRATORY: No respiratory distress  NEUROLOGIC: Cranial nerves II-XII grossly intact.   PSYCHIATRIC: Alert & oriented x3. Normal mood and affect.  FOOT EXAMINATION: Deferred  Assessment:       1. Uncontrolled type 2 diabetes mellitus with hyperglycemia, with long-term current use of insulin    2. Essential hypertension    3. Insulin pump in place    4. Vitamin D deficiency    5. Hyperlipidemia associated with type 2 diabetes mellitus    6. Persistent microalbuminuria associated with type 2 diabetes mellitus    7. Diabetic peripheral neuropathy associated with type 2 diabetes mellitus    8. Interstitial cystitis    9. Polyuria        Plan:   Leni was seen today for diabetes mellitus.    Diagnoses and all orders for this visit:    Uncontrolled type 2 diabetes mellitus with hyperglycemia, with long-term current use of insulin  -     POCT Glucose, Hand-Held Device  -     Hemoglobin A1C; Future  -     Lipid Panel; Future  -     COMPREHENSIVE METABOLIC PANEL; Future  -     TSH; Future  -     Microalbumin/Creatinine Ratio, Urine; Future  -     FREESTYLE SWATI 3 SENSOR Jigna; Change sensor every 14 days  -     TRESIBA FLEXTOUCH U-200 200 unit/mL (3 mL) insulin pen; Inject 110 Units into the skin once daily.  -     tirzepatide 2.5 mg/0.5 mL PnIj; Inject 2.5 mg  into the skin every 7 days.  -     NOVOLOG FLEXPEN U-100 INSULIN 100 unit/mL (3 mL) InPn pen; Inject 50 Units into the skin 3 (three) times daily with meals.  -     insulin regular hum U-500 conc (HUMULIN R U-500, CONC, KWIKPEN) 500 unit/mL (3 mL) insulin pen; Inject 120 units before breakfast, and 80 units before supper. Take 30 minutes before the respective meal.    -The plan will be to STOP MDI and switch to U500 only if affordable/approved. Current TDD is 260 units with uncontrolled T2 diabetes. Medically necessary to move forward with U500.    We will switch to U500 if approved- do not start this medication until you speak with me. Once you are on U500, you will STOP Tresiba and Novolog.     For now, continue Tresiba 110 units once daily until we see if U500 is covered.    For now, continue Novolog 50 units before each main meal until we can see if U500 is covered.    Continue Mounjaro 2.5 mg once a week. Main side effects = appetite suppression/weight loss, possibly nausea, constipation/diarrhea or reflux. If the symptoms are mild, they do improve with continued use. If you develop any abdominal pain or you are not tolerating this, stop the medication and let me know.    Continuous glucose monitoring report:    The patient's CGM was downloaded and was reviewed for the last 14 days. See HPI for interpretation & plan.      Essential hypertension    Insulin pump in place    Vitamin D deficiency    Hyperlipidemia associated with type 2 diabetes mellitus    Persistent microalbuminuria associated with type 2 diabetes mellitus  -     Microalbumin/Creatinine Ratio, Urine; Future    Diabetic peripheral neuropathy associated with type 2 diabetes mellitus    Interstitial cystitis  -     Urinalysis, Reflex to Urine Culture Urine, Clean Catch; Future    Polyuria  -     Urinalysis, Reflex to Urine Culture Urine, Clean Catch; Future      - Follow up: 3 months    Portions of this note may have been created with voice  "recognition software. Occasional "wrong-word" or "sound-a-like" substitutions may have occurred due to the inherent limitations of voice recognition software. Please, read the note carefully and recognize, using context, where substitutions have occurred.           OMARI Cunningham, BC-ADM [facility]  "

## 2024-10-01 NOTE — PATIENT INSTRUCTIONS
We will switch to U500 if approved- do not start this medication until you speak with me. Once you are on U500, you will STOP Tresiba and Novolog.     For now, continue Tresiba 110 units once daily until we see if U500 is covered.    For now, continue Novolog 50 units before each main meal until we can see if U500 is covered.    Continue Mounjaro 2.5 mg once a week. Main side effects = appetite suppression/weight loss, possibly nausea, constipation/diarrhea or reflux. If the symptoms are mild, they do improve with continued use. If you develop any abdominal pain or you are not tolerating this, stop the medication and let me know.

## 2024-10-02 ENCOUNTER — TELEPHONE (OUTPATIENT)
Dept: DIABETES | Facility: CLINIC | Age: 34
End: 2024-10-02
Payer: COMMERCIAL

## 2024-10-02 LAB
ALBUMIN/CREAT UR: 266.4 UG/MG (ref 0–30)
CREAT UR-MCNC: 259 MG/DL (ref 15–325)
ESTIMATED AVG GLUCOSE: 166 MG/DL (ref 68–131)
HBA1C MFR BLD: 7.4 % (ref 4–5.6)
MICROALBUMIN UR DL<=1MG/L-MCNC: 690 UG/ML
TSH SERPL DL<=0.005 MIU/L-ACNC: 1.93 UIU/ML (ref 0.4–4)

## 2024-10-02 RX ORDER — BLOOD-GLUCOSE SENSOR
EACH MISCELLANEOUS
Qty: 2 EACH | Refills: 11 | Status: SHIPPED | OUTPATIENT
Start: 2024-10-02

## 2024-10-02 NOTE — PROGRESS NOTES
A1c improving- now 7.4%. Cholesterol panel remains elevated, but is improved over previous. Normal kidney function. Mildly elevated liver enzyme - also improved over previous. Thyroid level is normal. Protein noted in the urine, also better than when last checked.    Was she able to get the U500?

## 2024-10-29 ENCOUNTER — TELEPHONE (OUTPATIENT)
Dept: DIABETES | Facility: CLINIC | Age: 34
End: 2024-10-29
Payer: COMMERCIAL

## 2024-11-05 ENCOUNTER — PATIENT MESSAGE (OUTPATIENT)
Dept: RESEARCH | Facility: HOSPITAL | Age: 34
End: 2024-11-05
Payer: COMMERCIAL

## 2024-11-10 DIAGNOSIS — R11.0 NAUSEA: ICD-10-CM

## 2024-11-11 RX ORDER — ONDANSETRON 4 MG/1
4 TABLET, ORALLY DISINTEGRATING ORAL EVERY 8 HOURS PRN
Qty: 30 TABLET | Refills: 0 | Status: SHIPPED | OUTPATIENT
Start: 2024-11-11

## 2024-12-02 ENCOUNTER — PATIENT MESSAGE (OUTPATIENT)
Dept: DIABETES | Facility: CLINIC | Age: 34
End: 2024-12-02
Payer: COMMERCIAL

## 2024-12-08 DIAGNOSIS — Z79.4 UNCONTROLLED TYPE 2 DIABETES MELLITUS WITH HYPERGLYCEMIA, WITH LONG-TERM CURRENT USE OF INSULIN: ICD-10-CM

## 2024-12-08 DIAGNOSIS — E11.65 UNCONTROLLED TYPE 2 DIABETES MELLITUS WITH HYPERGLYCEMIA, WITH LONG-TERM CURRENT USE OF INSULIN: ICD-10-CM

## 2024-12-09 RX ORDER — INSULIN ASPART 100 [IU]/ML
50 INJECTION, SOLUTION INTRAVENOUS; SUBCUTANEOUS
Qty: 45 ML | Refills: 5 | Status: SHIPPED | OUTPATIENT
Start: 2024-12-09

## 2024-12-20 ENCOUNTER — TELEPHONE (OUTPATIENT)
Dept: DIABETES | Facility: CLINIC | Age: 34
End: 2024-12-20
Payer: COMMERCIAL

## 2025-01-24 DIAGNOSIS — Z79.4 UNCONTROLLED TYPE 2 DIABETES MELLITUS WITH HYPERGLYCEMIA, WITH LONG-TERM CURRENT USE OF INSULIN: ICD-10-CM

## 2025-01-24 DIAGNOSIS — E11.65 UNCONTROLLED TYPE 2 DIABETES MELLITUS WITH HYPERGLYCEMIA, WITH LONG-TERM CURRENT USE OF INSULIN: ICD-10-CM

## 2025-01-26 DIAGNOSIS — Z79.4 UNCONTROLLED TYPE 2 DIABETES MELLITUS WITH HYPERGLYCEMIA, WITH LONG-TERM CURRENT USE OF INSULIN: ICD-10-CM

## 2025-01-26 DIAGNOSIS — E11.65 UNCONTROLLED TYPE 2 DIABETES MELLITUS WITH HYPERGLYCEMIA, WITH LONG-TERM CURRENT USE OF INSULIN: ICD-10-CM

## 2025-01-26 DIAGNOSIS — R11.0 NAUSEA: ICD-10-CM

## 2025-01-27 RX ORDER — ONDANSETRON 4 MG/1
4 TABLET, ORALLY DISINTEGRATING ORAL EVERY 8 HOURS PRN
Qty: 30 TABLET | Refills: 0 | Status: SHIPPED | OUTPATIENT
Start: 2025-01-27

## 2025-01-27 RX ORDER — INSULIN ASPART 100 [IU]/ML
50 INJECTION, SOLUTION INTRAVENOUS; SUBCUTANEOUS
Qty: 45 ML | Refills: 5 | Status: SHIPPED | OUTPATIENT
Start: 2025-01-27

## 2025-01-27 RX ORDER — INSULIN DEGLUDEC 200 U/ML
110 INJECTION, SOLUTION SUBCUTANEOUS DAILY
Qty: 6 PEN | Refills: 5 | Status: SHIPPED | OUTPATIENT
Start: 2025-01-27

## 2025-02-18 ENCOUNTER — LAB VISIT (OUTPATIENT)
Dept: LAB | Facility: HOSPITAL | Age: 35
End: 2025-02-18
Attending: NURSE PRACTITIONER
Payer: COMMERCIAL

## 2025-02-18 ENCOUNTER — OFFICE VISIT (OUTPATIENT)
Dept: DIABETES | Facility: CLINIC | Age: 35
End: 2025-02-18
Payer: COMMERCIAL

## 2025-02-18 VITALS
WEIGHT: 278.69 LBS | SYSTOLIC BLOOD PRESSURE: 149 MMHG | HEART RATE: 104 BPM | DIASTOLIC BLOOD PRESSURE: 92 MMHG | BODY MASS INDEX: 47.83 KG/M2

## 2025-02-18 DIAGNOSIS — E11.42 DIABETIC PERIPHERAL NEUROPATHY ASSOCIATED WITH TYPE 2 DIABETES MELLITUS: ICD-10-CM

## 2025-02-18 DIAGNOSIS — I10 PRIMARY HYPERTENSION: ICD-10-CM

## 2025-02-18 DIAGNOSIS — E78.5 HYPERLIPIDEMIA ASSOCIATED WITH TYPE 2 DIABETES MELLITUS: ICD-10-CM

## 2025-02-18 DIAGNOSIS — E11.69 HYPERLIPIDEMIA ASSOCIATED WITH TYPE 2 DIABETES MELLITUS: ICD-10-CM

## 2025-02-18 DIAGNOSIS — I10 ESSENTIAL HYPERTENSION: ICD-10-CM

## 2025-02-18 DIAGNOSIS — E11.65 UNCONTROLLED TYPE 2 DIABETES MELLITUS WITH HYPERGLYCEMIA, WITH LONG-TERM CURRENT USE OF INSULIN: Primary | ICD-10-CM

## 2025-02-18 DIAGNOSIS — E11.65 UNCONTROLLED TYPE 2 DIABETES MELLITUS WITH HYPERGLYCEMIA, WITH LONG-TERM CURRENT USE OF INSULIN: ICD-10-CM

## 2025-02-18 DIAGNOSIS — Z79.4 UNCONTROLLED TYPE 2 DIABETES MELLITUS WITH HYPERGLYCEMIA, WITH LONG-TERM CURRENT USE OF INSULIN: Primary | ICD-10-CM

## 2025-02-18 DIAGNOSIS — E11.29 PERSISTENT MICROALBUMINURIA ASSOCIATED WITH TYPE 2 DIABETES MELLITUS: ICD-10-CM

## 2025-02-18 DIAGNOSIS — R80.9 PERSISTENT MICROALBUMINURIA ASSOCIATED WITH TYPE 2 DIABETES MELLITUS: ICD-10-CM

## 2025-02-18 DIAGNOSIS — E55.9 VITAMIN D DEFICIENCY: ICD-10-CM

## 2025-02-18 DIAGNOSIS — N30.10 INTERSTITIAL CYSTITIS: ICD-10-CM

## 2025-02-18 DIAGNOSIS — Z79.4 UNCONTROLLED TYPE 2 DIABETES MELLITUS WITH HYPERGLYCEMIA, WITH LONG-TERM CURRENT USE OF INSULIN: ICD-10-CM

## 2025-02-18 LAB
ESTIMATED AVG GLUCOSE: 197 MG/DL (ref 68–131)
GLUCOSE SERPL-MCNC: 160 MG/DL (ref 70–110)
HBA1C MFR BLD: 8.5 % (ref 4–5.6)

## 2025-02-18 PROCEDURE — 83036 HEMOGLOBIN GLYCOSYLATED A1C: CPT | Performed by: NURSE PRACTITIONER

## 2025-02-18 PROCEDURE — 36415 COLL VENOUS BLD VENIPUNCTURE: CPT | Performed by: NURSE PRACTITIONER

## 2025-02-18 PROCEDURE — 99999 PR PBB SHADOW E&M-EST. PATIENT-LVL V: CPT | Mod: PBBFAC,,, | Performed by: NURSE PRACTITIONER

## 2025-02-18 RX ORDER — INSULIN DEGLUDEC 200 U/ML
110 INJECTION, SOLUTION SUBCUTANEOUS DAILY
Qty: 6 PEN | Refills: 5 | Status: SHIPPED | OUTPATIENT
Start: 2025-02-18

## 2025-02-18 RX ORDER — INSULIN ASPART 100 [IU]/ML
50 INJECTION, SOLUTION INTRAVENOUS; SUBCUTANEOUS
Qty: 45 ML | Refills: 5 | Status: SHIPPED | OUTPATIENT
Start: 2025-02-18

## 2025-02-18 NOTE — PROGRESS NOTES
Subjective:         Patient ID: Leni Dumont is a 35 y.o. female.  Patient's current PCP is Brian Darby MD.     Chief Complaint: Diabetes Mellitus    HPI  Leni Dumont is a 35 y.o. White female presenting for a follow up for diabetes. Patient has been diagnosed with type 2 diabetes since age 17 .  Received diabetes education: Yes-[facility], 12/2023    CURRENT DM MEDICATIONS:   Diabetes Medications               NOVOLOG FLEXPEN U-100 INSULIN 100 unit/mL (3 mL) InPn pen INJECT 50 UNITS SUBCUTANEOUSLY THREE TIMES DAILY WITH MEALS    repaglinide (PRANDIN) 2 MG tablet TAKE 1 TABLET BY MOUTH THREE TIMES DAILY BEFORE MEAL(S)    tirzepatide 2.5 mg/0.5 mL PnIj Inject 2.5 mg into the skin every 7 days.    TRESIBA FLEXTOUCH U-200 200 unit/mL (3 mL) insulin pen INJECT 150 UNITS INTO THE SKIN ONCE DAILY. DOSE INCREASE PLEASE ALLOW EARLY REFILL 100 units     Past failed treatment include: Metformin - significant diarrhea; Trulicity - diarrhea/bladder pain; Prandin,Byetta,Glipizide- diarrhea. Lantus/Basaglar and Ozempic- failure to control diabetes; Tresiba and Novolog-failure to control diabetes; U500- bladder pain;  Does not want doses of Mounjaro higher than 2.5 due to constipation    Blood glucose testing Continuous per Kal 3   Preferred lab: [place]    Any episodes of hypoglycemia? 0% per Kal    Complications related to diabetes: peripheral neuropathy and microalbuminuria    Her blood sugar in the clinic today was:   Lab Results   Component Value Date    POCGLU 160 (A) 02/18/2025     Leni Dumont presents today for follow up visit to discuss diabetes management. Due for A1c re-check.    Per Kal download, for the last 14 days:Average glucose of 183 mg/dL and 49% in range, otherwise high. Pattern of both fasting as well as postprandial hyperglycemia. She attributes much of the hyperglycemia to recent viral illnesses (part of treatment included steroids), and also, not always bolusing Novolog ahead of meals, as she  is unsure how much she will eat. We discussed options including trial of insulin pump therapy, if affordable. She would likely need meal size estimation bolusing as she does not want to carb count.          Neuropathy- Followed by Dr. Linda.     Interstitial cystitis- followed by Dr. Nita Kearns.      Current diet: Limited: Mainly carbs and meats. Can eat fresh green beans and carrots. Cannot eat wheat pasta. Eats a lot of chicken, tortillas, cheese. Can only drink Dasani water.     Activity Level: No structured exercise    Lab Results   Component Value Date    HGBA1C 7.4 (H) 10/01/2024    HGBA1C 7.9 (H) 06/26/2024    HGBA1C 10.9 (H) 11/28/2023     STANDARDS OF CARE  Diabetes Management Status    Statin: Taking  ACE/ARB: Not taking    Screening or Prevention Patient's value Goal Complete/Controlled?   HgA1C Testing and Control   Lab Results   Component Value Date    HGBA1C 7.4 (H) 10/01/2024      Annually/Less than 8% Yes   Lipid profile : 10/01/2024 Annually Yes   LDL control Lab Results   Component Value Date    LDLCALC 154.6 10/01/2024    Annually/Less than 100 mg/dl  No   Nephropathy screening Lab Results   Component Value Date    LABMICR 690.0 10/01/2024     Lab Results   Component Value Date    PROTEINUA Negative 09/29/2014     Lab Results   Component Value Date    UTPCR 0.05 06/11/2008      Annually Yes   Blood pressure BP Readings from Last 1 Encounters:   02/18/25 (!) 149/92    Less than 140/90 Yes   Dilated retinal exam : 05/28/2024 Annually Yes   Foot exam   : 02/18/2025 Annually Yes      Labs reviewed and are noted below.    Lab Results   Component Value Date    WBC 11.25 09/29/2014    HGB 13.4 09/29/2014    HCT 41.4 09/29/2014     (H) 09/29/2014    CHOL 228 (H) 10/01/2024    TRIG 167 (H) 10/01/2024    HDL 40 10/01/2024    LDLCALC 154.6 10/01/2024    ALT 49 (H) 10/01/2024    AST 35 10/01/2024     10/01/2024    K 4.4 10/01/2024     10/01/2024    ANIONGAP 8 10/01/2024    CREATININE  0.8 10/01/2024    ESTGFRAFRICA 144 12/11/2020    EGFRNONAA >60 09/29/2014    BUN 9 10/01/2024    CO2 24 10/01/2024    TSH 1.930 10/01/2024     (H) 10/01/2024    UTPCR 0.05 06/11/2008     Lab Results   Component Value Date    GLUTAMICACID 0.00 11/28/2023    CPEPTIDE 4.97 11/28/2023    TSH 1.930 10/01/2024    [id number] 759 06/30/2023    CALCIUM 9.6 10/01/2024    PHOS 4.2 06/11/2008     Lab Results   Component Value Date    CPEPTIDE 4.97 11/28/2023     Lab Results   Component Value Date    GLUTAMICACID 0.00 11/28/2023     Glucose   Date Value Ref Range Status   10/01/2024 146 (H) 70 - 110 mg/dL Final     Anion Gap   Date Value Ref Range Status   10/01/2024 8 8 - 16 mmol/L Final     eGFR if    Date Value Ref Range Status   09/29/2014 >60 >60 mL/min/1.73 m^2 Final     eGFR    Date Value Ref Range Status   12/11/2020 144  Final     eGFR if non    Date Value Ref Range Status   09/29/2014 >60 >60 mL/min/1.73 m^2 Final     Comment:     Calculation used to obtain the estimated glomerular filtration  rate (eGFR) is the CKD-EPI equation. Since race is unknown   in our information system, the eGFR values for   -American and Non--American patients are given   for each creatinine result.         The following portions of the patient's history were reviewed and updated as appropriate: allergies, current medications, past family history, past medical history, past social history, past surgical history, and problem list.    Review of patient's allergies indicates:   Allergen Reactions    Dextromethorphan     Glipizide     Dextromethorphan-guaifenesin     Dicyclomine     Doxycycline     Erythromycin     Exefen [pseudoephedrine-guaifenesin]     Medrol [methylprednisolone]     Phenylephrine      Social History     Socioeconomic History    Marital status: Single   Tobacco Use    Smoking status: Never    Smokeless tobacco: Never   Substance and Sexual Activity     Alcohol use: No    Drug use: No    Sexual activity: Yes     Past Medical History:   Diagnosis Date    Asthma     Diabetes mellitus     Heart disease     Hyperlipidemia     Interstitial cystitis     Varicose veins of left leg with edema     Vocal cord dysfunction 09/04/2024       REVIEW OF SYSTEMS:  Eyes No history of DR.  Cardiovascular: History of HTN and HLD.   GI: No history of pancreatitis or gastroparesis.  :H/o interstitial cystitis. Positive microalbuminuria.   Neuro: Positive neuropathy.  PSYCH: No tobacco use.  ENDO: See HPI.        Objective:      Vitals:    02/18/25 1304   BP: (!) 149/92   Pulse: 104     RESPIRATORY: No respiratory distress  NEUROLOGIC: Cranial nerves II-XII grossly intact.   PSYCHIATRIC: Alert & oriented x3. Normal mood and affect.  FOOT EXAMINATION: Protective Sensation (w/ 10 gram monofilament):  Right: Intact  Left: Intact    Visual Inspection:  Normal -  Bilateral and Nails Intact - without Evidence of Foot Deformity- Bilateral    Pedal Pulses:   Right: Present  Left: Present    Posterior Tibialis Pulses:   Right:Present  Left: Present    Assessment:       1. Uncontrolled type 2 diabetes mellitus with hyperglycemia, with long-term current use of insulin    2. Essential hypertension    3. Vitamin D deficiency    4. Hyperlipidemia associated with type 2 diabetes mellitus    5. Persistent microalbuminuria associated with type 2 diabetes mellitus    6. Diabetic peripheral neuropathy associated with type 2 diabetes mellitus    7. Interstitial cystitis    8. Primary hypertension        Plan:   Leni was seen today for diabetes mellitus.    Diagnoses and all orders for this visit:    Uncontrolled type 2 diabetes mellitus with hyperglycemia, with long-term current use of insulin  -     POCT Glucose, Hand-Held Device  -     Ambulatory referral/consult to Diabetes Education; Future  -     Hemoglobin A1C; Future  -     NOVOLOG FLEXPEN U-100 INSULIN 100 unit/mL (3 mL) InPn pen; Inject 50  "Units into the skin 3 (three) times daily with meals.  -     TRESIBA FLEXTOUCH U-200 200 unit/mL (3 mL) insulin pen; Inject 110 Units into the skin once daily.  -     tirzepatide 2.5 mg/0.5 mL PnIj; Inject 2.5 mg into the skin every 7 days.    For now, continue Tresiba 110 units once daily.    For now, continue Novolog 50 units before each main meal.     Continue Mounjaro 2.5 mg once a week.    Referral pre-pump assessment. Would do good with meal estimation bolusing. Would need to change sensor for compatibility.      Essential hypertension    Vitamin D deficiency    Hyperlipidemia associated with type 2 diabetes mellitus    Persistent microalbuminuria associated with type 2 diabetes mellitus    Diabetic peripheral neuropathy associated with type 2 diabetes mellitus    Interstitial cystitis    Primary hypertension        - Follow up: 6 weeks    Portions of this note may have been created with voice recognition software. Occasional "wrong-word" or "sound-a-like" substitutions may have occurred due to the inherent limitations of voice recognition software. Please, read the note carefully and recognize, using context, where substitutions have occurred.           Angelina Clemente,KIRSTINC, BC-ADM  [facility]    "

## 2025-02-18 NOTE — PATIENT INSTRUCTIONS
For now, continue Tresiba 110 units once daily.    For now, continue Novolog 50 units before each main meal.     Continue Mounjaro 2.5 mg once a week.    Referral pre-pump assessment. Would do good with meal estimation bolusing. Would need to change sensor for compatibility.

## 2025-02-19 ENCOUNTER — RESULTS FOLLOW-UP (OUTPATIENT)
Dept: DIABETES | Facility: CLINIC | Age: 35
End: 2025-02-19

## 2025-02-19 NOTE — PROGRESS NOTES
A1c did worsen to 8.5% as we expected -- hopefully will be able to move forward (if affordable) with an insulin pump to see if you have a better response. Keep scheduled appt for pre-pump assessment with the education team.

## 2025-03-04 ENCOUNTER — CLINICAL SUPPORT (OUTPATIENT)
Dept: DIABETES | Facility: CLINIC | Age: 35
End: 2025-03-04
Payer: COMMERCIAL

## 2025-03-04 VITALS — BODY MASS INDEX: 47.87 KG/M2 | WEIGHT: 278.88 LBS

## 2025-03-04 DIAGNOSIS — Z79.4 UNCONTROLLED TYPE 2 DIABETES MELLITUS WITH HYPERGLYCEMIA, WITH LONG-TERM CURRENT USE OF INSULIN: ICD-10-CM

## 2025-03-04 DIAGNOSIS — E11.65 UNCONTROLLED TYPE 2 DIABETES MELLITUS WITH HYPERGLYCEMIA, WITH LONG-TERM CURRENT USE OF INSULIN: ICD-10-CM

## 2025-03-04 PROCEDURE — 99999 PR PBB SHADOW E&M-EST. PATIENT-LVL IV: CPT | Mod: PBBFAC,,,

## 2025-03-04 PROCEDURE — G0108 DIAB MANAGE TRN  PER INDIV: HCPCS | Mod: S$GLB,,, | Performed by: PEDIATRICS

## 2025-03-04 NOTE — Clinical Note
Good morning, Ms. Dumont is interested in the OP5 with Dexcom G7. Please place orders for both. Thank you! MAGALY Reynolds

## 2025-03-04 NOTE — PROGRESS NOTES
Diabetes Care Specialist Progress Note  Author: Gail Park RN  Date: 3/4/2025    Intake    Program Intake  Reason for Diabetes Program Visit:: Initial Diabetes Assessment  Current diabetes risk level:: low  In the last month, have you used the ER or been admitted to the hospital: No    Current Diabetes Treatment: Insulin, DM Injectables  DM Injectables Type/Dose: Mounjaro 2.5 mg weekly.  Method of insulin delivery?: Injections  Injection Type: Pens  Pen Type/Dose: Tresiba 110 units daily. Novolog 50 units TID AC.    Continuous Glucose Monitoring  Patient has CGM: Yes  Personal CGM type:: Kal 3  GMI Date: 02/21/25  GMI Value: 7.7 %    Lab Results   Component Value Date    HGBA1C 8.5 (H) 02/18/2025       Weight: 126.5 kg (278 lb 14.1 oz)   Body mass index is 47.87 kg/m².    Lifestyle Coping Support & Clinical    Lifestyle/Coping/Support  Compared to other people your age, how would you rate your health?: Fair  Does anyone in your family have diabetes or does anyone in your family support you in your diabetes care?: Family hx of DM. Has a good support system at home for DM care.  List anything about Diabetes that causes you stress?: None.  How do you deal with stress/distress?: N/A.  Learning Barriers:: Visual (Wears glasses.)  Culture or Church beliefs that may impact ability to access healthcare: No  Psychosocial/Coping Skills Assessment Completed: : Yes  Assessment indicates:: Adequate understanding  Area of need?: No    Problem Review  Active Comorbidities: Other (comment) (Vocal cord dysfunction. Interstitial cystitis.)    Diabetes Self-Management Skills Assessment    Medication Skills Assessment  Patient is able to identify current diabetes medications, dosages, and appropriate timing of medications.: yes  Patient reports problems or concerns with current medication regimen.: yes  Medication regimen problems/concerns:: does not feel like regimen is working  Patient is  aware that some diabetes  "medications can cause low blood sugar?: Yes  Medication Skills Assessment Completed:: Yes  Assessment indicates:: Knowledge deficit, Instruction Needed  Area of need?: Yes    Diabetes Disease Process/Treatment Options  Diabetes Type?: Type II  When were you diagnosed?: Diagnosed at age of 18 y/o.  If previous diabetes education, when/where:: Yes-[facility] 2024.  What are your goals for this education session?: Learn about the different types of pumps.  Is patient aware of what causes diabetes?: Yes  Does patient understand the pathophysiology of diabetes?: Yes  Diabetes Disease Process/Treatment Options: Skills Assessment Completed: Yes  Assessment indicates:: Knowledge deficit  Area of need?: Yes    Nutrition/Healthy Eating  Meal Plan 24 Hour Recall - Breakfast: None.  Meal Plan 24 Hour Recall - Lunch: None.  Meal Plan 24 Hour Recall - Dinner: 2 8" pizzas from [employer]; Water.  Meal Plan 24 Hour Recall - Snack: Handful of potato chips. Beef stew.  Meal Plan 24 Hour Recall - Beverage: Water. SF cold drinks.  Who shops/cooks?: Mom.  Patient can identify foods that impact blood sugar.: yes (Pasta.)  Challenges to healthy eating:: other (see comments) (Limited food tolerances due to interstitial cystitis.)  Nutrition/Healthy Eating Skills Assessment Completed:: Yes  Assessment indicates:: Instruction Needed, Knowledge deficit  Area of need?: Yes    Physical Activity/Exercise  Physical Activity/Exercise Skills Assessment Completed: : No  Deffered due to:: Time  Area of need?: Deferred    Home Blood Glucose Monitoring  Patient states that blood sugar is checked at home daily.: yes  Monitoring Method:: personal continuous glucose monitor  Personal CGM type:: Kal 3   What is your current Time in Range?: 50%  What is your A1c Target?: <7%  Home Blood Glucose Monitoring Skills Assessment Completed: : Yes  Assessment indicates:: Knowledge deficit, Instruction Needed  Area of need?: Yes    Acute Complications  Acute Complications " Skills Assessment Completed: : No  Deffered due to:: Time  Area of need?: Deferred    Chronic Complications  Chronic Complications Skills Assessment Completed: : No  Deferred due to:: Time  Area of need?: Deferred      Assessment Summary and Plan    Based on today's diabetes care assessment, the following areas of need were identified:      Identified Areas of Need      Medication/Current Diabetes Treatment: Yes-Discussed MOA, onset, side effects, dosage of Tresiba, Novolog, and Mounjaro.      Lifestyle Coping/Support: No-Mom present and supportive.      Diabetes Disease Process/Treatment Options: Yes-Educated on patho of Type 2 DM.      Nutrition/Healthy Eating: Yes-See care plan.     Physical Activity/Exercise: Deferred.     Home Blood Glucose Monitoring: Yes-Knows she will have to switch to Dexcom G7.      Acute Complications: Deferred.      Chronic Complications: Deferred.         Today's interventions were provided through individual discussion, instruction, and written materials were provided.      Patient verbalized understanding of instruction and written materials.  Pt was able to return back demonstration of instructions today. Patient understood key points, needs reinforcement and further instruction.     Diabetes Self-Management Care Plan:    Today's Diabetes Self-Management Care Plan was developed with Leni's input. Leni has agreed to work toward the following goal(s) to improve his/her overall diabetes control.      Care Plan: Diabetes Management   Updates made since 3/4/2024 12:00 AM        Problem: Healthy Eating         Goal: Patient agrees to carb count in prepartion for Omnipod 5.    Start Date: 3/4/2025   Expected End Date: 2/18/2026   Note:    Educated on practicing carb counting in preparation for pump therapy.   Encouraged keeping food log.        Task: Reviewed the sources and role of Carbohydrate, Protein, and Fat and how each nutrient impacts blood sugar. Completed 3/4/2025         Task: Explained how to count carbohydrates using the food label and the use of dry measuring cups for accurate carb counting. Completed 3/4/2025        Task: Discussed strategies for choosing healthier menu options when dining out. Completed 3/4/2025        Task: Review the importance of balancing carbohydrates with each meal using portion control techniques to count servings of carbohydrate and label reading to identify serving size and amount of total carbs per serving. Completed 3/4/2025          Follow Up Plan     Follow up when Dexcom G7 and OP5 supplies are received.    Today's care plan and follow up schedule was discussed with patient.  Leni verbalized understanding of the care plan, goals, and agrees to follow up plan.        The patient was encouraged to communicate with his/her health care provider/physician and care team regarding his/her condition(s) and treatment.  I provided the patient with my contact information today and encouraged to contact me via phone or [facility] Patient Portal as needed.     Length of Visit   Total Time: 90 Minutes

## 2025-03-14 ENCOUNTER — TELEPHONE (OUTPATIENT)
Dept: DIABETES | Facility: CLINIC | Age: 35
End: 2025-03-14
Payer: COMMERCIAL

## 2025-03-14 NOTE — TELEPHONE ENCOUNTER
Discussed how OP5 may not be the best option due to her TDD. Would like to come in and discuss other options. Scheduled per pt preference. Successful call.

## 2025-03-17 ENCOUNTER — CLINICAL SUPPORT (OUTPATIENT)
Dept: DIABETES | Facility: CLINIC | Age: 35
End: 2025-03-17
Payer: COMMERCIAL

## 2025-03-17 DIAGNOSIS — Z79.4 UNCONTROLLED TYPE 2 DIABETES MELLITUS WITH HYPERGLYCEMIA, WITH LONG-TERM CURRENT USE OF INSULIN: Primary | ICD-10-CM

## 2025-03-17 DIAGNOSIS — E11.65 UNCONTROLLED TYPE 2 DIABETES MELLITUS WITH HYPERGLYCEMIA, WITH LONG-TERM CURRENT USE OF INSULIN: Primary | ICD-10-CM

## 2025-03-17 PROCEDURE — G0108 DIAB MANAGE TRN  PER INDIV: HCPCS | Mod: S$GLB,,, | Performed by: PEDIATRICS

## 2025-03-17 PROCEDURE — 99999 PR PBB SHADOW E&M-EST. PATIENT-LVL III: CPT | Mod: PBBFAC,,,

## 2025-03-17 NOTE — PROGRESS NOTES
Diabetes Care Specialist Follow-up Note  Author: Gail Park RN  Date: 3/17/2025    Intake    Program Intake  Reason for Diabetes Program Visit:: Intervention  Type of Intervention:: Individual  Individual: Education  Education: Self-Management Skill Review  Current diabetes risk level:: low  In the last month, have you used the ER or been admitted to the hospital: No  Permission to speak with others about care:: yes    Current Diabetes Treatment: Insulin, DM Injectables  DM Injectables Type/Dose: Mounjaro 2.5 mg weekly.  Method of insulin delivery?: Injections  Injection Type: Pens  Pen Type/Dose: Tresiba 110 units daily. Novolog 50 units TID AC.    Continuous Glucose Monitoring  Patient has CGM: Yes  Personal CGM type:: Akl 3  GMI Date: 03/17/25    Lab Results   Component Value Date    HGBA1C 8.5 (H) 02/18/2025     A1c Pre Diabetes Care Specialist Intervention:  8.5%      Physical activity/Exercise:   Deferred.    SMBG: Kal 3 Plus      Diabetes Self-Management Skills Assessment    Home Blood Glucose Monitoring  Personal CGM type:: Kal 3    During today's follow-up visit,  the following areas required further assessment and content was provided/reviewed.    Based on today's diabetes care assessment, the following areas of need were identified:      Identified Areas of Need      Medication/Current Diabetes Treatment: See care plan.     Nutrition/Healthy Eating: Goal met.         Today's interventions were provided through individual discussion, instruction, and written materials were provided.    Patient verbalized understanding of instruction and written materials.  Pt was able to return back demonstration of instructions today. Patient understood key points, needs reinforcement and further instruction.     Diabetes Self-Management Care Plan Review and Evaluation of Progress:    During today's follow-up [facility] Diabetes Self-Management Care Plan progress was reviewed and progress was evaluated  including his/her input. Leni has agreed to continue his/her journey to improve/maintain overall diabetes control by continuing to set health goals. See care plan progress below.      Care Plan: Diabetes Management   Updates made since 3/17/2024 12:00 AM        Problem: Healthy Eating         Goal: Patient agrees to carb count in prepartion for Omnipod 5. Completed 3/17/2025   Start Date: 3/4/2025   Expected End Date: 2/18/2026   This Visit's Progress: Met   Note:    Educated on practicing carb counting in preparation for pump therapy.   Encouraged keeping food log.     3/17/25: Keeping food logs and counting carbs accurately. Reinforced this positive behavior.       Task: Reviewed the sources and role of Carbohydrate, Protein, and Fat and how each nutrient impacts blood sugar. Completed 3/4/2025        Task: Explained how to count carbohydrates using the food label and the use of dry measuring cups for accurate carb counting. Completed 3/4/2025        Task: Discussed strategies for choosing healthier menu options when dining out. Completed 3/4/2025        Task: Review the importance of balancing carbohydrates with each meal using portion control techniques to count servings of carbohydrate and label reading to identify serving size and amount of total carbs per serving. Completed 3/4/2025        Problem: Medications         Goal: Patient agrees to take medications as prescribed until she starts on pump therapy.    Start Date: 3/17/2025   Expected End Date: 2/18/2026   Note:    Originally interested in OP5 with Dexcom G7 or Kal 2+, but her TDD is too high for OP5.   Visit today for options other than OP5.    Discussed pre-pump topics:   Pump options w/ compatible CGM: Medtronic 780G using Guardian 4; Tandem t:slim x2 using Dexcom G6/G7 or Kal 2 Plus.   Common terms: basal/bolus insulin, IC, ISF, TBS, IOB  Pros/cons pump therapy, supplies needed, frequency of changing infusion sets and cartridges/pods, and  backup pump plan   Allowed pt to see demo pumps and example infusion set    Pt is keeping log of carbs and accurately counting.            Follow Up Plan     Follow up when Tandem supplies are received.    Today's care plan and follow up schedule was discussed with patient.  Leni verbalized understanding of the care plan, goals, and agrees to follow up plan.        The patient was encouraged to communicate with his/her health care provider/physician and care team regarding his/her condition(s) and treatment.  I provided the patient with my contact information today and encouraged to contact me via phone or [facility] Patient Portal as needed.     Length of Visit   Total Time: 60 Minutes

## 2025-03-17 NOTE — Clinical Note
Good afternoon, I met with Ms. Dumont again to discuss options other than OP5. She is interested in Tandem t-slim with Control IQ. She will need pump supplies and Kal 2+ sensors. Please place orders when able and place pump setting orders. Thank you! MAGALY Reynolds

## 2025-04-02 ENCOUNTER — OFFICE VISIT (OUTPATIENT)
Dept: DIABETES | Facility: CLINIC | Age: 35
End: 2025-04-02
Payer: COMMERCIAL

## 2025-04-02 VITALS
DIASTOLIC BLOOD PRESSURE: 85 MMHG | HEART RATE: 78 BPM | WEIGHT: 274.06 LBS | BODY MASS INDEX: 47.04 KG/M2 | SYSTOLIC BLOOD PRESSURE: 142 MMHG

## 2025-04-02 DIAGNOSIS — E66.01 CLASS 3 SEVERE OBESITY DUE TO EXCESS CALORIES WITH SERIOUS COMORBIDITY AND BODY MASS INDEX (BMI) OF 45.0 TO 49.9 IN ADULT: ICD-10-CM

## 2025-04-02 DIAGNOSIS — E11.65 UNCONTROLLED TYPE 2 DIABETES MELLITUS WITH HYPERGLYCEMIA, WITH LONG-TERM CURRENT USE OF INSULIN: Primary | ICD-10-CM

## 2025-04-02 DIAGNOSIS — Z79.4 UNCONTROLLED TYPE 2 DIABETES MELLITUS WITH HYPERGLYCEMIA, WITH LONG-TERM CURRENT USE OF INSULIN: Primary | ICD-10-CM

## 2025-04-02 DIAGNOSIS — E66.813 CLASS 3 SEVERE OBESITY DUE TO EXCESS CALORIES WITH SERIOUS COMORBIDITY AND BODY MASS INDEX (BMI) OF 45.0 TO 49.9 IN ADULT: ICD-10-CM

## 2025-04-02 LAB — GLUCOSE SERPL-MCNC: 227 MG/DL (ref 70–110)

## 2025-04-02 PROCEDURE — 99214 OFFICE O/P EST MOD 30 MIN: CPT | Mod: S$GLB,,, | Performed by: NURSE PRACTITIONER

## 2025-04-02 PROCEDURE — G2211 COMPLEX E/M VISIT ADD ON: HCPCS | Mod: S$GLB,,, | Performed by: NURSE PRACTITIONER

## 2025-04-02 PROCEDURE — 3052F HG A1C>EQUAL 8.0%<EQUAL 9.0%: CPT | Mod: CPTII,S$GLB,, | Performed by: NURSE PRACTITIONER

## 2025-04-02 PROCEDURE — 3079F DIAST BP 80-89 MM HG: CPT | Mod: CPTII,S$GLB,, | Performed by: NURSE PRACTITIONER

## 2025-04-02 PROCEDURE — 3077F SYST BP >= 140 MM HG: CPT | Mod: CPTII,S$GLB,, | Performed by: NURSE PRACTITIONER

## 2025-04-02 PROCEDURE — 1160F RVW MEDS BY RX/DR IN RCRD: CPT | Mod: CPTII,S$GLB,, | Performed by: NURSE PRACTITIONER

## 2025-04-02 PROCEDURE — 99999 PR PBB SHADOW E&M-EST. PATIENT-LVL V: CPT | Mod: PBBFAC,,, | Performed by: NURSE PRACTITIONER

## 2025-04-02 PROCEDURE — 82962 GLUCOSE BLOOD TEST: CPT | Mod: S$GLB,,, | Performed by: NURSE PRACTITIONER

## 2025-04-02 PROCEDURE — 3008F BODY MASS INDEX DOCD: CPT | Mod: CPTII,S$GLB,, | Performed by: NURSE PRACTITIONER

## 2025-04-02 PROCEDURE — 1159F MED LIST DOCD IN RCRD: CPT | Mod: CPTII,S$GLB,, | Performed by: NURSE PRACTITIONER

## 2025-04-02 RX ORDER — BLOOD-GLUCOSE SENSOR
1 EACH MISCELLANEOUS
Qty: 2 EACH | Refills: 11 | Status: SHIPPED | OUTPATIENT
Start: 2025-04-02 | End: 2026-04-02

## 2025-04-02 RX ORDER — INSULIN DEGLUDEC 200 U/ML
110 INJECTION, SOLUTION SUBCUTANEOUS DAILY
Qty: 6 PEN | Refills: 5 | Status: SHIPPED | OUTPATIENT
Start: 2025-04-02

## 2025-04-02 RX ORDER — INSULIN ASPART 100 [IU]/ML
50 INJECTION, SOLUTION INTRAVENOUS; SUBCUTANEOUS
Qty: 45 ML | Refills: 5 | Status: SHIPPED | OUTPATIENT
Start: 2025-04-02

## 2025-04-02 NOTE — PROGRESS NOTES
Patient ID: Leni Dumont is a 35 y.o. female.  Patient's current PCP is Brian Darby MD.   Collaborating Physician: SEAN Gore MD    Chief Complaint: Diabetes Mellitus    HPI  Leni Dumont is a 35 y.o. White female presenting for a new consult with me, previously seen by TODD Clemente NP  for diabetes.       Patient has been diagnosed with type 2 diabetes since age 17 . Hx of Interstitial cystitis  Complications related to diabetes:  microalbuminuria  Recent diabetes related hospitalizations: none    Previous diabetes education: yes  Occupation: Disabled, lives with her mother  LMP: ---    Current diet: Eating only starches, chicken and a few fruits and vegetables. States multiple foods cause intolerable bladder pain.   Current weight: 274#  Weight at FOV: 274# on 4/2/25  Activity level: none    Changes made at the last visit: per Angelina  For now, continue Tresiba 110 units once daily.  For now, continue Novolog 50 units before each main meal.   Continue Mounjaro 2.5 mg once a week.  Referral pre-pump assessment. Would do good with meal estimation bolusing. Would need to change sensor for compatibility.    Current issues: Multiple complaints. Here to discuss insulin pump process started by previous provider. Patient and mother report past work ups for [place] with ENDO were negative    Personal history of pancreatitis: denies  Personal history of abdominal surgery: denies  Personal history of thyroid surgery: denies  Family history of pancreatic cancer in first-degree relative: denies  Family history of MTC/MEN/endocrine tumors: denies       Past Medical History:   Diagnosis Date    Asthma     Diabetes mellitus     Heart disease     Hyperlipidemia     Interstitial cystitis     Varicose veins of left leg with edema     Vocal cord dysfunction 09/04/2024     Social History[1]    Review of patient's allergies indicates:   Allergen Reactions    Dextromethorphan     Glipizide     Dextromethorphan-guaifenesin      Dicyclomine     Doxycycline     Erythromycin     Exefen [pseudoephedrine-guaifenesin]     Medrol [methylprednisolone]     Phenylephrine        CURRENT DM MEDICATIONS:   Diabetes Medications                   tirzepatide 2.5 mg/0.5 mL PnIj Inject 2.5 mg into the skin every 7 days.    TRESIBA FLEXTOUCH U-200 200 unit/mL (3 mL) insulin pen Inject 100 Units into the skin once daily. In ams    NOVOLOG FLEXPEN U-100 INSULIN 100 unit/mL (3 mL) InPn pen Inject 50 Units into the skin 3 (three) times daily with meals.  100 units ac meals - eating 2 meals per day   Of note: Refill hx does not support these doses of insulin daily    Past failed treatment(s) include:   Metformin - significant diarrhea;   Trulicity - diarrhea/bladder pain;   Prandin,Byetta,Glipizide- diarrhea.   Lantus/Basaglar and Ozempic- failure to control diabetes;   Tresiba and Novolog-failure to control diabetes;   U500- bladder pain;    Does not want doses of Mounjaro higher than 2.5 due to constipation     Meter/cgm: Kal 3+ - sharing  Blood glucose testing is performed regularly.   Patient is testing continuously times per day.  Any episodes of hypoglycemia? Denies  Glucose trends:   Not wearing for past 2 weeks - ran out of rx    CGM data:  --  Hx shows fairly flat overnight and between meals. Spiking above goal after eating with some prolonged highs for 3-5 hours    Her blood sugar in the clinic today was:   Lab Results   Component Value Date    POCGLU 227 (A) 04/02/2025       Statin: Taking  ACE/ARB: Not taking    Labs reviewed and are noted below.    Screening or Prevention Patient's value Goal Complete/Controlled?   HgA1C Testing and Control   Lab Results   Component Value Date    HGBA1C 8.5 (H) 02/18/2025      Annually/Less than 8% No   Lipid profile : 10/01/2024 Annually Yes   LDL control Lab Results   Component Value Date    LDLCALC 154.6 10/01/2024    Annually/Less than 100 mg/dl  No   Nephropathy screening Lab Results   Component Value Date     MICALBCREAT 266.4 (H) 10/01/2024     Lab Results   Component Value Date    PROTEINUA Negative 09/29/2014    Annually Yes   Blood pressure BP Readings from Last 1 Encounters:   04/02/25 (!) 142/85    Less than 140/90 No   Dilated retinal exam : 05/28/2024 Annually Yes    Foot exam   : 02/18/2025 Annually Yes     Glucose   Date Value Ref Range Status   10/01/2024 146 (H) 70 - 110 mg/dL Final     Anion Gap   Date Value Ref Range Status   10/01/2024 8 8 - 16 mmol/L Final     eGFR if    Date Value Ref Range Status   09/29/2014 >60 >60 mL/min/1.73 m^2 Final     eGFR    Date Value Ref Range Status   12/11/2020 144  Final     eGFR if non    Date Value Ref Range Status   09/29/2014 >60 >60 mL/min/1.73 m^2 Final     Comment:     Calculation used to obtain the estimated glomerular filtration  rate (eGFR) is the CKD-EPI equation. Since race is unknown   in our information system, the eGFR values for   -American and Non--American patients are given   for each creatinine result.       Lab Results   Component Value Date    GLUTAMICACID 0.00 11/28/2023    CPEPTIDE 4.97 11/28/2023    TSH 1.930 10/01/2024     Lab Results   Component Value Date    CPEPTIDE 4.97 11/28/2023     Lab Results   Component Value Date    GLUTAMICACID 0.00 11/28/2023       Wt Readings from Last 3 Encounters:   04/02/25 1448 124.3 kg (274 lb 0.5 oz)   03/04/25 1015 126.5 kg (278 lb 14.1 oz)   02/18/25 1304 126.4 kg (278 lb 10.6 oz)       Review of Systems   Constitutional:  Positive for malaise/fatigue. Negative for weight loss.   Eyes:  Negative for blurred vision and double vision.   Respiratory:  Negative for shortness of breath.    Cardiovascular: Negative.    Gastrointestinal:  Positive for constipation.   Genitourinary:  Positive for dysuria and frequency.   Musculoskeletal:  Negative for myalgias.   Neurological: Negative.    Psychiatric/Behavioral: Negative.         Physical Exam  Vitals  reviewed.   Constitutional:       General: She is not in acute distress.     Appearance: Normal appearance. She is obese.   Eyes:      Pupils: Pupils are equal, round, and reactive to light.   Cardiovascular:      Rate and Rhythm: Normal rate and regular rhythm.      Pulses: Normal pulses.      Heart sounds: Normal heart sounds.   Pulmonary:      Effort: Pulmonary effort is normal.      Breath sounds: Normal breath sounds.   Abdominal:      General: Bowel sounds are normal.      Palpations: Abdomen is soft.   Skin:     General: Skin is warm and dry.      Comments: Sites without hypertrophy and/or signs of infection   Neurological:      General: No focal deficit present.      Mental Status: She is alert and oriented to person, place, and time.   Psychiatric:         Mood and Affect: Mood normal.         Behavior: Behavior normal.           Assessment & Plan    Leni was seen today for diabetes mellitus.    Diagnoses and all orders for this visit:    Uncontrolled type 2 diabetes mellitus with hyperglycemia, with long-term current use of insulin  -     POCT Glucose, Hand-Held Device  -     blood-glucose sensor (FREESTYLE SWATI 3 PLUS SENSOR) Jigna; 1 each by Misc.(Non-Drug; Combo Route) route every 14 (fourteen) days.  -     NOVOLOG FLEXPEN U-100 INSULIN 100 unit/mL (3 mL) InPn pen; Inject 50 Units into the skin 3 (three) times daily with meals.  -     TRESIBA FLEXTOUCH U-200 200 unit/mL (3 mL) insulin pen; Inject 110 Units into the skin once daily.  -     Continue current therapy for now.  -     Pursue Tandem T slim x2 pump with CIQ. Will benefit from Automode and extended bolus features  -     Consider Low dose Tresiba with pump therapy     Class 3 severe obesity due to excess calories with serious comorbidity and body mass index (BMI) of 45.0 to 49.9 in adult  - Encouraged portion control of foods able to eat  - Encouraged protein with carbs  - Encouraged daily intake of vegetables that were tolerated        -  Follow up: 6 weeks    Visit today included increased complexity associated with the care of the episodic problem GLUCOSE CONTROL addressed and managing the longitudinal care of the patient due to the serious and/or complex managed problem(s) DIABETES.    Note: Possible pump settings:  based on weight 124.3 kg x 1 u/kg = 124 units daily  Basal: 124/2 = 62 units TBD, 62/24= 2.58 u/hr basal rate  Bolus: 450/124 =3.6 grams carb per 1 unit insulin  Correction:1800/124 = 14.5 mg/dl per unit insulin             [1]   Social History  Socioeconomic History    Marital status: Single   Tobacco Use    Smoking status: Never    Smokeless tobacco: Never   Substance and Sexual Activity    Alcohol use: No    Drug use: No    Sexual activity: Yes

## 2025-04-04 ENCOUNTER — PATIENT MESSAGE (OUTPATIENT)
Dept: DIABETES | Facility: CLINIC | Age: 35
End: 2025-04-04
Payer: COMMERCIAL

## 2025-04-04 DIAGNOSIS — Z79.4 UNCONTROLLED TYPE 2 DIABETES MELLITUS WITH HYPERGLYCEMIA, WITH LONG-TERM CURRENT USE OF INSULIN: Primary | ICD-10-CM

## 2025-04-04 DIAGNOSIS — E11.65 UNCONTROLLED TYPE 2 DIABETES MELLITUS WITH HYPERGLYCEMIA, WITH LONG-TERM CURRENT USE OF INSULIN: Primary | ICD-10-CM

## 2025-04-07 RX ORDER — BLOOD-GLUCOSE SENSOR
1 EACH MISCELLANEOUS
Qty: 2 EACH | Refills: 11 | Status: SHIPPED | OUTPATIENT
Start: 2025-04-07 | End: 2026-04-07

## 2025-05-20 ENCOUNTER — TELEPHONE (OUTPATIENT)
Dept: DIABETES | Facility: CLINIC | Age: 35
End: 2025-05-20
Payer: COMMERCIAL

## 2025-05-20 ENCOUNTER — PATIENT MESSAGE (OUTPATIENT)
Dept: DIABETES | Facility: CLINIC | Age: 35
End: 2025-05-20
Payer: COMMERCIAL

## 2025-05-20 NOTE — TELEPHONE ENCOUNTER
Scheduled pump training for May 27th at 1:00 pm with SUZANNE Wright at [facility].     Jolie   Ms. Dumont received her Tandem pump. Please place orders for pump settings and send a vial of insulin to the pharmacy.   Thank you,  MAGALY Reynolds    ----- Message from Diabetic Educator Kyle sent at 5/20/2025  2:37 PM CDT -----  Contact: Leni    ----- Message -----  From: Shaneka Zarate  Sent: 5/20/2025   2:01 PM CDT  To: [facility] Diabetes Education Clinical Support Sta#    Type:  Schedule follow up Appointment RequestName of Caller:Deniz ask if is possible to be seen this Thursday or this Friday Would the patient rather a call back or a response via [facility]? Call Anand Call Back Number:[phone]!

## 2025-05-21 RX ORDER — INSULIN ASPART 100 [IU]/ML
INJECTION, SOLUTION INTRAVENOUS; SUBCUTANEOUS
Qty: 50 ML | Refills: 11 | Status: SHIPPED | OUTPATIENT
Start: 2025-05-21

## 2025-05-21 NOTE — TELEPHONE ENCOUNTER
Tandem pump start settings:     Pump initiation orders:    Prescriptions for a tandem insulin pump and vials of Novolog insulin were sent to pharmacy/DME.  Patient has seen diabetes education for pump evaluation and will be scheduled for pump training and start     Pump Settings  Basal Rate:   12A: 2.6 u/hr    Max basal rate:3 u/hr    Carb Ratio:   12A: 3     Max carb bolus: 30    ISF:  12A: 14     Reverse correction: off    Target: 110    Correct above: 110    Active insulin time: 3 hours    Auto mode: On     OMARI Ferguson

## 2025-05-27 ENCOUNTER — CLINICAL SUPPORT (OUTPATIENT)
Dept: DIABETES | Facility: CLINIC | Age: 35
End: 2025-05-27
Payer: COMMERCIAL

## 2025-05-27 ENCOUNTER — PATIENT MESSAGE (OUTPATIENT)
Dept: DIABETES | Facility: CLINIC | Age: 35
End: 2025-05-27
Payer: COMMERCIAL

## 2025-05-27 VITALS — BODY MASS INDEX: 47.42 KG/M2 | HEIGHT: 64 IN | WEIGHT: 277.75 LBS

## 2025-05-27 DIAGNOSIS — Z79.4 UNCONTROLLED TYPE 2 DIABETES MELLITUS WITH HYPERGLYCEMIA, WITH LONG-TERM CURRENT USE OF INSULIN: Primary | ICD-10-CM

## 2025-05-27 DIAGNOSIS — E11.65 UNCONTROLLED TYPE 2 DIABETES MELLITUS WITH HYPERGLYCEMIA, WITH LONG-TERM CURRENT USE OF INSULIN: Primary | ICD-10-CM

## 2025-05-27 PROCEDURE — 99999 PR PBB SHADOW E&M-EST. PATIENT-LVL I: CPT | Mod: PBBFAC,,, | Performed by: DIETITIAN, REGISTERED

## 2025-05-27 NOTE — Clinical Note
Tandem started yesterday. I checked her report today and she is doing well. Pump report is in media. I will continue to check on her.  She sees you next week and I f/u in 2 weeks.

## 2025-05-27 NOTE — PROGRESS NOTES
"Diabetes Care Specialist Follow-up Note  Author: Kyle Gregg RD, [facility]  Date: 5/28/2025    Intake    Program Intake  Reason for Diabetes Program Visit:: Intervention  Type of Intervention:: Individual  Individual: Device Training  Device Training: Insulin Pump Start  Current diabetes risk level:: low  Permission to speak with others about care:: yes    Current Diabetes Treatment: Insulin, DM Injectables  Oral Medication Type/Dose: prandin, 2 mg in med list but pt does not take this  DM Injectables Type/Dose: Mounjaro 2.5 mg weekly.  Method of insulin delivery?: Injections  Injection Type: Pens  Pen Type/Dose: Tresiba 110 units daily. Novolog 50 units TID AC.    Continuous Glucose Monitoring  Personal CGM type:: Kal 3    Lab Results   Component Value Date    HGBA1C 8.5 (H) 02/18/2025       Weight: 126 kg (277 lb 12.5 oz)   Height: 5' 4" (162.6 cm)   Body mass index is 47.68 kg/m².      Diabetes Self-Management Skills Assessment    Nutrition/Healthy Eating  Meal Plan 24 Hour Recall - Lunch: 2 cups mashed potatoes, green beans, baked chicken; water  Meal Plan 24 Hour Recall - Dinner: Lunchable cheese and crackers only; water    Home Blood Glucose Monitoring  Personal CGM type:: Kal 3    Acute Complications  Acute Complications Skills Assessment Completed: : No  Deffered due to:: Time  Area of need?: Deferred    Chronic Complications  Chronic Complications Skills Assessment Completed: : No  Deferred due to:: Time  Area of need?: Deferred    During today's follow-up visit,  the following areas required further assessment and content was provided/reviewed.    Based on today's diabetes care assessment, the following areas of need were identified:      Identified Areas of Need      Acute Complications: Deferred   Gave instructions on how to treat hypoglycemia     Chronic Complications: Deferred       Today's interventions were provided through individual discussion, instruction, and written materials were " provided.    Patient verbalized understanding of instruction and written materials.  Pt was able to return back demonstration of instructions today. Patient understood key points, needs reinforcement and further instruction.     Diabetes Self-Management Care Plan Review and Evaluation of Progress:    During today's follow-up Mikhails Diabetes Self-Management Care Plan progress was reviewed and progress was evaluated including his/her input. Leni has agreed to continue his/her journey to improve/maintain overall diabetes control by continuing to set health goals. See care plan progress below.      Care Plan: Diabetes Management   Updates made since 5/28/2024 12:00 AM          Problem: Medications         Goal: Patient agrees to take medications as prescribed until she starts on pump therapy. Completed 5/27/2025   Start Date: 3/17/2025   Expected End Date: 2/18/2026   Note:    Started Tandem t:Slim today       Goal: Patient agrees to use Tandem Tslim pump with Control IQ for continuous insulin delivery. She will bolus using carbs and/or BG reading    Start Date: 5/27/2025   Expected End Date: 11/28/2025        Task: Tandem pump training Completed 5/28/2025   Note:    TANDEM INSULIN PUMP START  Tandem Tslim insulin pump with Novolog insulin.   Pump start orders per Jolie Patten NP.  Pump training provided per Tandem pump protocol.   Long-acting insulin last taken in morning on 5/26/25.     TOPICS COVERED     Pump overview:  Pump therapy basics: basal/bolus, Insulin to carb ratio, correction factor, Insulin on Board (IOB)  Pump accessories; rechargeable battery; IPX7 (watertight); regular maintenance and cleaning  Screen on (wake); Screen Lock - turns off after 3 accidental screen taps  Home Screen Icons, Symbols, and home T button; Status, Bolus and Options menus   Device settings  Review Pump Info and History screens   Insulin Delivery Settings:  Personal Profiles:  Create, program, edit/ duplicate, activate,  rename, delete  Pump settings:  Quick bolus, Max bolus, Basal Limit  Basal programmin.1 u/hr. minimum basal (0.001 increments); Temporary Rates - Program (0%-250%/15 min-72 hr)  Suspend/Resume insulin delivery   -------------------------------------------------------------------------------  INITIAL SETTINGS:        Basal rate:  2.6 u/hr    Bolus settings:  CF 14  I:C 3  Target B  Duration of Insulin Action: 5 hrs  Max Bolus: 30 units  Carbs: On  Low Insulin Alert: 30 units  Auto Off: Off  Quick Bolus: Off  ---------------------------------------------------------------------------------    Loading Cartridge:   Change cartridge  Fill Cartridge: minimum/Maximum cartridge fill (95 units/300 units)   Fill Tubing:  Minimum/Maximum fill (10 units/30 units).   Fill Cannula  Site Change reminder  Resume insulin and review fill estimate on home screen  Infusion Sets:  Pt received the Varisoft infusion sets, but this is not what she wanted.   Gave patient Autosoft XC infusion sets   Type/Cannula length:  9 mm   Proper set selection, site placement and rotation  Change every 2-3 days as directed by HCP.   Delivering Boluses:  Standard food bolus adding multiple carbs; cancelling bolus; correction bolus; food bolus with correction   Above/Below BG Target and IOB   Extended bolus feature; Quick bolus feature (optional)    My CGM:  Entering transmitter ID, entering sensor code, starting sensor session  2 hr. warm up period (no Basal-IQ or Control-IQ technology functionality during warm up), sensor calibration optional if sensor code entered  Alerts; CGM graph; trend arrows; connectivity between pump and mobile earl      Control-IQ Technology:   Turn Control-IQ technology on/off. Active CGM session and Personal Profile with Carbs on and Control-IQ settings are required.   Uses 30-minute predicted CGM values to adjust insulin deliver. Treatment values are not customizable.   Review Control-IQ technology flag and status  icons to explain how Control-IQ technology works.   Activity: Exercise and sleep programming, use and icons to explain how Control-IQ technology works.   When Control-IQ is active, IOB reflects both bolus and basal insulin delivery.   Caution with manual injections or inhaled insulin while using Control-IQ    Safety Information:   Importance of backup plan and emergency supplies   Troubleshooting hypoglycemia and hyperglycemia   Precautions and Safety Tips       T:connect web application and mobile earl:     Pump serial number: [id number]    Training resources: t:slim X2 pump user guide, quick reference guides, t:simulator earl, e-learning modules, and online resources         Follow Up Plan     Follow up in about 2 weeks (around 6/10/2025).    Today's care plan and follow up schedule was discussed with patient.  Leni verbalized understanding of the care plan, goals, and agrees to follow up plan.        The patient was encouraged to communicate with his/her health care provider/physician and care team regarding his/her condition(s) and treatment.  I provided the patient with my contact information today and encouraged to contact me via phone or [facility] Patient Portal as needed.     Length of Visit   Total Time: 145 Minutes

## 2025-05-29 ENCOUNTER — PATIENT MESSAGE (OUTPATIENT)
Dept: DIABETES | Facility: CLINIC | Age: 35
End: 2025-05-29
Payer: COMMERCIAL

## 2025-05-29 ENCOUNTER — TELEPHONE (OUTPATIENT)
Dept: DIABETES | Facility: CLINIC | Age: 35
End: 2025-05-29
Payer: COMMERCIAL

## 2025-05-29 NOTE — TELEPHONE ENCOUNTER
Called patient for f/u since starting insulin pump on Tuesday.   Pt stated that she is doing well. She changed her infusion set and cartridge this morning. She has noted that the pump shows that she has 180 units in the cartridge but she filled with 300 units.   Advised to call Tandem customer support if the total units does not increase after she gives her first bolus.   Pt to continue to read food labels for carb totals and estimate total carbs when label is not available.

## 2025-05-29 NOTE — TELEPHONE ENCOUNTER
Pt called because her insulin pump now shows that she has 120 units even though she filled it with 300 units.   Advised pt to call Tandem customer service.

## 2025-06-04 ENCOUNTER — OFFICE VISIT (OUTPATIENT)
Dept: DIABETES | Facility: CLINIC | Age: 35
End: 2025-06-04
Payer: COMMERCIAL

## 2025-06-04 VITALS
HEART RATE: 88 BPM | SYSTOLIC BLOOD PRESSURE: 132 MMHG | DIASTOLIC BLOOD PRESSURE: 88 MMHG | BODY MASS INDEX: 48.4 KG/M2 | WEIGHT: 281.94 LBS

## 2025-06-04 DIAGNOSIS — E66.01 CLASS 3 SEVERE OBESITY DUE TO EXCESS CALORIES WITH SERIOUS COMORBIDITY AND BODY MASS INDEX (BMI) OF 45.0 TO 49.9 IN ADULT: ICD-10-CM

## 2025-06-04 DIAGNOSIS — E11.65 UNCONTROLLED TYPE 2 DIABETES MELLITUS WITH HYPERGLYCEMIA, WITH LONG-TERM CURRENT USE OF INSULIN: Primary | ICD-10-CM

## 2025-06-04 DIAGNOSIS — E66.813 CLASS 3 SEVERE OBESITY DUE TO EXCESS CALORIES WITH SERIOUS COMORBIDITY AND BODY MASS INDEX (BMI) OF 45.0 TO 49.9 IN ADULT: ICD-10-CM

## 2025-06-04 DIAGNOSIS — Z79.4 UNCONTROLLED TYPE 2 DIABETES MELLITUS WITH HYPERGLYCEMIA, WITH LONG-TERM CURRENT USE OF INSULIN: Primary | ICD-10-CM

## 2025-06-04 LAB — GLUCOSE SERPL-MCNC: 122 MG/DL (ref 70–110)

## 2025-06-04 PROCEDURE — 99999 PR PBB SHADOW E&M-EST. PATIENT-LVL V: CPT | Mod: PBBFAC,,, | Performed by: NURSE PRACTITIONER

## 2025-06-04 RX ORDER — BENZONATATE 200 MG/1
200 CAPSULE ORAL 3 TIMES DAILY PRN
COMMUNITY
Start: 2025-05-05

## 2025-06-04 RX ORDER — UREA 40 %
CREAM (GRAM) TOPICAL
COMMUNITY
Start: 2025-04-25

## 2025-06-04 RX ORDER — CICLOPIROX OLAMINE 7.7 MG/G
CREAM TOPICAL 2 TIMES DAILY PRN
COMMUNITY
Start: 2025-05-10

## 2025-06-04 RX ORDER — METRONIDAZOLE 7.5 MG/G
CREAM TOPICAL 2 TIMES DAILY
COMMUNITY

## 2025-06-04 RX ORDER — ECONAZOLE NITRATE 10 MG/G
CREAM TOPICAL 2 TIMES DAILY
COMMUNITY

## 2025-06-04 RX ORDER — CLINDAMYCIN HYDROCHLORIDE 300 MG/1
300 CAPSULE ORAL 3 TIMES DAILY
COMMUNITY
Start: 2025-05-05

## 2025-06-04 RX ORDER — SODIUM SULFACETAMIDE AND SULFUR 100; 50 MG/G; MG/G
CREAM TOPICAL
COMMUNITY
Start: 2025-04-25

## 2025-06-04 RX ORDER — SELENIUM SULFIDE 2.5 MG/100ML
LOTION TOPICAL
COMMUNITY
Start: 2025-04-25

## 2025-06-04 RX ORDER — GENTAMICIN SULFATE 1 MG/G
OINTMENT TOPICAL
COMMUNITY
Start: 2025-04-25

## 2025-06-04 RX ORDER — SULFACETAMIDE SODIUM, SULFUR 100; 50 MG/G; MG/G
EMULSION TOPICAL
COMMUNITY
Start: 2024-09-20

## 2025-06-04 RX ORDER — LIDOCAINE 50 MG/G
OINTMENT TOPICAL 4 TIMES DAILY PRN
COMMUNITY
Start: 2025-04-25

## 2025-06-06 PROBLEM — E11.65 UNCONTROLLED TYPE 2 DIABETES MELLITUS WITH HYPERGLYCEMIA, WITH LONG-TERM CURRENT USE OF INSULIN: Status: ACTIVE | Noted: 2025-06-06

## 2025-06-06 PROBLEM — Z79.4 UNCONTROLLED TYPE 2 DIABETES MELLITUS WITH HYPERGLYCEMIA, WITH LONG-TERM CURRENT USE OF INSULIN: Status: ACTIVE | Noted: 2025-06-06

## 2025-06-09 ENCOUNTER — TELEPHONE (OUTPATIENT)
Dept: DIABETES | Facility: CLINIC | Age: 35
End: 2025-06-09
Payer: COMMERCIAL

## 2025-06-09 NOTE — TELEPHONE ENCOUNTER
----- Message from Jolie Patten NP sent at 6/6/2025  9:15 PM CDT -----  Place order in [place] to [facility] to change pump sites to Autosoft 90  9 mm 23/60 please

## 2025-06-09 NOTE — TELEPHONE ENCOUNTER
On 6/5, it was requested to changed infusion set to auto xc by RD. An order was completed in [place]. Just wanting to confirm we are changing the order from autosoft xc to autosoft 90. Please advise.

## 2025-06-10 ENCOUNTER — CLINICAL SUPPORT (OUTPATIENT)
Dept: DIABETES | Facility: CLINIC | Age: 35
End: 2025-06-10
Payer: COMMERCIAL

## 2025-06-10 VITALS — HEIGHT: 64 IN | BODY MASS INDEX: 48.37 KG/M2 | WEIGHT: 283.31 LBS

## 2025-06-10 DIAGNOSIS — Z79.4 UNCONTROLLED TYPE 2 DIABETES MELLITUS WITH HYPERGLYCEMIA, WITH LONG-TERM CURRENT USE OF INSULIN: Primary | ICD-10-CM

## 2025-06-10 DIAGNOSIS — E11.65 UNCONTROLLED TYPE 2 DIABETES MELLITUS WITH HYPERGLYCEMIA, WITH LONG-TERM CURRENT USE OF INSULIN: Primary | ICD-10-CM

## 2025-06-10 PROCEDURE — G0108 DIAB MANAGE TRN  PER INDIV: HCPCS | Mod: S$GLB,,, | Performed by: DIETITIAN, REGISTERED

## 2025-06-10 PROCEDURE — 99999 PR PBB SHADOW E&M-EST. PATIENT-LVL I: CPT | Mod: PBBFAC,,, | Performed by: DIETITIAN, REGISTERED

## 2025-06-10 NOTE — PROGRESS NOTES
"Diabetes Care Specialist Follow-up Note  Author: Kyle Gregg RD, [facility]  Date: 6/10/2025    Intake    Program Intake  Reason for Diabetes Program Visit:: Intervention  Type of Intervention:: Individual  Individual: Education  Education: Advanced Pump, Self-Management Skill Review    Current Diabetes Treatment: Insulin, DM Injectables  Oral Medication Type/Dose: prandin, 2 mg in med list but pt does not take this  DM Injectables Type/Dose: Mounjaro 2.5 mg weekly.  Method of insulin delivery?: Insulin Pump  Type of Pump: Tandem t:slim Control IQ - Novolog  Does patient have back-up plan?: No  Any problems obtaining supplies?: No    PUMP SETTINGS  -------------------------------------  Basal/Bolus settings:      12a: 2.75 units/hr  // CF 14 //  IC 2  --------------------------------------  Target 110  Insulin duration - 5 hrs   --------------------------------------     Insulin pump report in media      Continuous Glucose Monitoring  Patient has CGM: Yes  Personal CGM type:: Kal 2 plus  GMI Date: 06/10/25  GMI Value: 7.4 %        Lab Results   Component Value Date    HGBA1C 8.5 (H) 02/18/2025       Weight: 128.5 kg (283 lb 4.7 oz)   Height: 5' 4" (162.6 cm)   Body mass index is 48.63 kg/m².  Wt gain of 6 lbs since last visit on 5/27/2025    Diabetes Self-Management Skills Assessment    Nutrition/Healthy Eating  Meal Plan 24 Hour Recall - Breakfast: none  Meal Plan 24 Hour Recall - Lunch: beef stew from can (no potatoes, add cheese); water  Meal Plan 24 Hour Recall - Dinner: mashed potatoes with gravy, broccoli, rice and cheese, garlic bread; SF root beer  Meal Plan 24 Hour Recall - Snack: none  Meal Plan 24 Hour Recall - Beverage: water, SF sodas    Home Blood Glucose Monitoring  Personal CGM type:: Kal 2 plus    Acute Complications  Have you ever had hypoglycemia (low BG 70 or less)?: no  Do you know the symptoms of low blood sugar and how to treat?: when below 100, pt feels lightheaded and dizzy, double " vision //  eats a small pc of candy  Have you ever had hyperglycemia (high  or more)?: yes  How often and what are your symptoms?: not recently //  felt tired  How do you treat hyperglycemia? : can now do a correction bolus  Have you ever had DKA?: no  Do you ever test for ketones?: no  Acute Complications Skills Assessment Completed: : Yes  Assessment indicates:: Knowledge deficit  Area of need?: Yes    Chronic Complications  Reviewed health maintenance: yes  Have you completed your annual diabetes maintenance labwork? : yes  Do you examine your feet daily?: yes  Has your doctor examined your feet?: yes  Do you see a Dentist?: yes  Dentist date of last visit:: last month  Do you see an eye doctor?: yes  Eye doctor date of last visit:: due for eye appt - going to [facility]  Chronic Complications Skills Assessment Completed: : Yes  Assessment indicates:: Instruction Needed  Area of need?: Yes      During today's follow-up visit,  the following areas required further assessment and content was provided/reviewed.    Based on today's diabetes care assessment, the following areas of need were identified:      Identified Areas of Need      Medication/Current Diabetes Treatment:     Lifestyle Coping/Support:     Diabetes Disease Process/Treatment Options:     Nutrition/Healthy Eating:      Physical Activity/Exercise:      Home Blood Glucose Monitoring:      Acute Complications: Yes    Chronic Complications: Yes       Today's interventions were provided through individual discussion, instruction, and written materials were provided.    Patient verbalized understanding of instruction and written materials.  Pt was able to return back demonstration of instructions today. Patient understood key points, needs reinforcement and further instruction.     Diabetes Self-Management Care Plan Review and Evaluation of Progress:    During today's follow-up Mikhails Diabetes Self-Management Care Plan progress was reviewed  and progress was evaluated including his/her input. Leni has agreed to continue his/her journey to improve/maintain overall diabetes control by continuing to set health goals. See care plan progress below.      Care Plan: Diabetes Management   Updates made since 6/10/2024 12:00 AM          Problem: Medications           Goal: Patient agrees to use Tandem Tslim pump with Control IQ for continuous insulin delivery. She will bolus using carbs and/or BG reading    Start Date: 5/27/2025   Expected End Date: 11/28/2025   This Visit's Progress: On track   Note:    Pt is using pump and doing well    She was given the Varisoft infusion sets, but at pump start, gave pt a box of Autosoft XC  Demonstrated how to insert the Varisoft infusion set and patient practiced on practice belly.   She has also watched videos.   Gave more Autosoft XC sets to use  When she has to switch to Varisoft, she will need to change fill cannula amount to 0.7 units.   Showed how to make this change.     Reviewed pump report.   Noted several food boluses, but pt stated that she only ate twice a day with no snacks.   She is putting in fake carbs to get BG down.  Showed how to do a correction only bolus.   Pt said she tried but the pump wouldn't allow her to give the bolus.   Went through the menus to deliver correction.   Pt is to try again when needed.   Messaging provider to possibly enhance the CF           Follow Up Plan     Follow up in about 8 weeks (around 8/5/2025).    Today's care plan and follow up schedule was discussed with patient.  Leni verbalized understanding of the care plan, goals, and agrees to follow up plan.        The patient was encouraged to communicate with his/her health care provider/physician and care team regarding his/her condition(s) and treatment.  I provided the patient with my contact information today and encouraged to contact me via phone or [facility] Patient Portal as needed.     Length of Visit   Total Time:  145 Minutes

## 2025-06-10 NOTE — Clinical Note
Pump report is in media I noticed that she has a lot of food boluses and she told me that she only eats twice a day and doesn't snack.  She is entering fake carbs to get her BG down because it won't correct her BG.  Her CF is 14. When you saw her last time, you decreased her IC from 4 to 2.  I'm thinking her CF needs to be more aggressive as well.

## 2025-06-13 ENCOUNTER — TELEPHONE (OUTPATIENT)
Dept: DIABETES | Facility: CLINIC | Age: 35
End: 2025-06-13
Payer: COMMERCIAL

## 2025-06-13 NOTE — TELEPHONE ENCOUNTER
----- Message from Jolie Patten NP sent at 6/11/2025  5:41 PM CDT -----  Ok, we can have her change her CF to 10  ----- Message -----  From: Kyle Gregg RD, [facility]  Sent: 6/11/2025   4:22 PM CDT  To: Jolie Patten NP    I just added it.   Sorry about that!  ----- Message -----  From: Jolie Patten NP  Sent: 6/11/2025  12:47 PM CDT  To: Kyle Gregg RD, [facility]    Kyle,   I don't see the report in her chart - Jolie  ----- Message -----  From: Kyle Gregg RD, [facility]  Sent: 6/10/2025   2:51 PM CDT  To: Jolie Patten NP    Pump report is in media  I noticed that she has a lot of food boluses and she told me that she only eats twice a day and doesn't snack.   She is entering fake carbs to get her BG down because it won't correct her BG.   Her CF is 14. When you saw her last time, you decreased her IC from 4 to 2.   I'm thinking her CF needs to be more aggressive as well.

## 2025-06-13 NOTE — TELEPHONE ENCOUNTER
Called patient to help change CF from 14 to 10 per message from Jolie Patten NP.   Pt did not answer. Left message with change.   Advised to call back or call Tandem customer support if she needs help making the change.   Feel patient will be able to complete on her since we reviewed this in last appt.    normal...

## 2025-06-16 ENCOUNTER — TELEPHONE (OUTPATIENT)
Dept: UROLOGY | Facility: CLINIC | Age: 35
End: 2025-06-16
Payer: COMMERCIAL

## 2025-06-16 ENCOUNTER — PATIENT MESSAGE (OUTPATIENT)
Dept: UROLOGY | Facility: CLINIC | Age: 35
End: 2025-06-16

## 2025-06-16 NOTE — TELEPHONE ENCOUNTER
.Outgoing call attempted to notify patient regarding her missed appointment but no answer, left voice message with contact information letting patient know she can contact us at her earliest convenience for assistance with rescheduling or she can send us a zervedt message.

## 2025-06-17 ENCOUNTER — PATIENT MESSAGE (OUTPATIENT)
Dept: DIABETES | Facility: CLINIC | Age: 35
End: 2025-06-17
Payer: COMMERCIAL

## 2025-06-18 RX ORDER — INSULIN ASPART 100 [IU]/ML
INJECTION, SOLUTION INTRAVENOUS; SUBCUTANEOUS
Qty: 50 ML | Refills: 11 | Status: SHIPPED | OUTPATIENT
Start: 2025-06-18

## 2025-07-23 ENCOUNTER — PATIENT MESSAGE (OUTPATIENT)
Dept: DIABETES | Facility: CLINIC | Age: 35
End: 2025-07-23
Payer: COMMERCIAL

## 2025-07-25 DIAGNOSIS — Z79.4 UNCONTROLLED TYPE 2 DIABETES MELLITUS WITH HYPERGLYCEMIA, WITH LONG-TERM CURRENT USE OF INSULIN: Primary | ICD-10-CM

## 2025-07-25 DIAGNOSIS — E11.65 UNCONTROLLED TYPE 2 DIABETES MELLITUS WITH HYPERGLYCEMIA, WITH LONG-TERM CURRENT USE OF INSULIN: Primary | ICD-10-CM

## 2025-07-25 RX ORDER — BLOOD-GLUCOSE SENSOR
1 EACH MISCELLANEOUS
Qty: 3 EACH | Refills: 5 | Status: SHIPPED | OUTPATIENT
Start: 2025-07-25 | End: 2026-07-25

## 2025-08-06 ENCOUNTER — CLINICAL SUPPORT (OUTPATIENT)
Dept: DIABETES | Facility: CLINIC | Age: 35
End: 2025-08-06
Payer: COMMERCIAL

## 2025-08-06 DIAGNOSIS — Z79.4 UNCONTROLLED TYPE 2 DIABETES MELLITUS WITH HYPERGLYCEMIA, WITH LONG-TERM CURRENT USE OF INSULIN: Primary | ICD-10-CM

## 2025-08-06 DIAGNOSIS — E11.65 UNCONTROLLED TYPE 2 DIABETES MELLITUS WITH HYPERGLYCEMIA, WITH LONG-TERM CURRENT USE OF INSULIN: Primary | ICD-10-CM

## 2025-08-06 PROCEDURE — G0108 DIAB MANAGE TRN  PER INDIV: HCPCS | Mod: S$GLB,,, | Performed by: PEDIATRICS

## 2025-08-06 PROCEDURE — 99999 PR PBB SHADOW E&M-EST. PATIENT-LVL III: CPT | Mod: PBBFAC,,,

## 2025-08-06 NOTE — PROGRESS NOTES
Diabetes Care Specialist Follow-up Note  Author: Gail Park RN  Date: 8/6/2025    Intake    Program Intake  Reason for Diabetes Program Visit:: Intervention  Type of Intervention:: Individual  Individual: Education  Education: Self-Management Skill Review  In the last month, have you used the ER or been admitted to the hospital: No  Permission to speak with others about care:: yes    Current Diabetes Treatment: Insulin, DM Injectables  Oral Medication Type/Dose: Prandin 2 mg (not taking)  DM Injectables Type/Dose: Mounjaro 2.5 mg weekly.  Method of insulin delivery?: Insulin Pump  Type of Pump: Tandem t:slim Control IQ - Novolog  Any problems obtaining supplies?: Yes (Gave box of AutoSoft 30s because on last AutoSoft XC.)    Continuous Glucose Monitoring  Patient has CGM: Yes  Personal CGM type:: Dexcom G7  GMI Date: 08/06/25  GMI Value:  (N/A.)    Lab Results   Component Value Date    HGBA1C 8.5 (H) 02/18/2025     A1c Pre Diabetes Care Specialist Intervention:  8.5%      SMBG: Dexcom G7 with Tandem T-Slim using Control-IQ. Report in media.        Diabetes Self-Management Skills Assessment    Nutrition/Healthy Eating  Meal Plan 24 Hour Recall - Breakfast: None.  Meal Plan 24 Hour Recall - Lunch: Can of beef stew with no potatoes (60 grams); Water.  Meal Plan 24 Hour Recall - Dinner: Sonic: Cheese sticks (put in 60 grams); Water.  Meal Plan 24 Hour Recall - Snack: None.  Meal Plan 24 Hour Recall - Beverage: Water.    Home Blood Glucose Monitoring  Personal CGM type:: Dexcom G7    During today's follow-up visit,  the following areas required further assessment and content was provided/reviewed.    Based on today's diabetes care assessment, the following areas of need were identified:      Identified Areas of Need      Medication/Current Diabetes Treatment: See care plan for update.      Nutrition/Healthy Eating: Has been over estimating carbs. With IC change, educated on the importance of being accurate with  carb counting.          Today's interventions were provided through individual discussion, instruction, and written materials were provided.    Patient verbalized understanding of instruction and written materials.  Pt was able to return back demonstration of instructions today. Patient understood key points, needs reinforcement and further instruction.     Diabetes Self-Management Care Plan Review and Evaluation of Progress:    During today's follow-up Jamison Diabetes Self-Management Care Plan progress was reviewed and progress was evaluated including his/her input. Leni has agreed to continue his/her journey to improve/maintain overall diabetes control by continuing to set health goals. See care plan progress below.      Care Plan: Diabetes Management   Updates made since 8/6/2024 12:00 AM        Problem: Healthy Eating         Goal: Patient agrees to carb count in prepartion for Omnipod 5. Completed 3/17/2025   Start Date: 3/4/2025   Expected End Date: 2/18/2026   This Visit's Progress: Met   Note:    Educated on practicing carb counting in preparation for pump therapy.   Encouraged keeping food log.     3/17/25: Keeping food logs and counting carbs accurately. Reinforced this positive behavior.       Task: Reviewed the sources and role of Carbohydrate, Protein, and Fat and how each nutrient impacts blood sugar. Completed 3/4/2025        Task: Explained how to count carbohydrates using the food label and the use of dry measuring cups for accurate carb counting. Completed 3/4/2025        Task: Discussed strategies for choosing healthier menu options when dining out. Completed 3/4/2025        Task: Review the importance of balancing carbohydrates with each meal using portion control techniques to count servings of carbohydrate and label reading to identify serving size and amount of total carbs per serving. Completed 3/4/2025        Problem: Medications         Goal: Patient agrees to take medications as  prescribed until she starts on pump therapy. Completed 5/27/2025   Start Date: 3/17/2025   Expected End Date: 2/18/2026   Note:    Started Tandem t:Slim today       Goal: Patient agrees to use Tandem Tslim pump with Control IQ for continuous insulin delivery. She will bolus using carbs and/or BG reading    Start Date: 5/27/2025   Expected End Date: 11/28/2025   Recent Progress: On track   Note:    Pt is using pump and doing well  She was given the Varisoft infusion sets, but at pump start, gave pt a box of Autosoft XC  Demonstrated how to insert the Varisoft infusion set and patient practiced on practice belly.   She has also watched videos.   Gave more Autosoft XC sets to use  When she has to switch to Varisoft, she will need to change fill cannula amount to 0.7 units.   Showed how to make this change.     Reviewed pump report.   Noted several food boluses, but pt stated that she only ate twice a day with no snacks.   She is putting in fake carbs to get BG down.  Showed how to do a correction only bolus.   Pt said she tried but the pump wouldn't allow her to give the bolus.   Went through the menus to deliver correction.   Pt is to try again when needed.   Messaging provider to possibly enhance the CF    8/06/25: Blood sugars running high frequently; pt states this related to pain and allergies.     Spoke with VANDANA Dave who states to change the following:   Decrease IC (1:2 grams to 1:1 grams)  Decrease ISF by 5 (1:14 mg/dL to 1:9 mg/dL)  Increase basal by 0.5 from 6a-12a (2.75 units/hr to 3.25 units/hr)    We also changed sleep mode to 12 am to 6 am.   Ms. Dumont is to notify us with any issues, especially hypoglycemia.     Out of AutoSoft XC.   Ms. Dumont given AutoSoft 30s until her shipment of XC come in.  Watched tutorial on how to insert 30s. Instructed to use video to help her at home but to reach out with any issues.        Task: Tandem pump training Completed 5/28/2025   Note:    TANDEM INSULIN PUMP  START  Tandem Tslim insulin pump with Novolog insulin.   Pump start orders per Jolie Patten NP.  Pump training provided per Tandem pump protocol.   Long-acting insulin last taken in morning on 25.     TOPICS COVERED     Pump overview:  Pump therapy basics: basal/bolus, Insulin to carb ratio, correction factor, Insulin on Board (IOB)  Pump accessories; rechargeable battery; IPX7 (watertight); regular maintenance and cleaning  Screen on (wake); Screen Lock - turns off after 3 accidental screen taps  Home Screen Icons, Symbols, and home T button; Status, Bolus and Options menus   Device settings  Review Pump Info and History screens   Insulin Delivery Settings:  Personal Profiles:  Create, program, edit/ duplicate, activate, rename, delete  Pump settings:  Quick bolus, Max bolus, Basal Limit  Basal programmin.1 u/hr. minimum basal (0.001 increments); Temporary Rates - Program (0%-250%/15 min-72 hr)  Suspend/Resume insulin delivery   -------------------------------------------------------------------------------  INITIAL SETTINGS:        Basal rate:  2.6 u/hr    Bolus settings:  CF 14  I:C 3  Target B  Duration of Insulin Action: 5 hrs  Max Bolus: 30 units  Carbs: On  Low Insulin Alert: 30 units  Auto Off: Off  Quick Bolus: Off  ---------------------------------------------------------------------------------    Loading Cartridge:   Change cartridge  Fill Cartridge: minimum/Maximum cartridge fill (95 units/300 units)   Fill Tubing:  Minimum/Maximum fill (10 units/30 units).   Fill Cannula  Site Change reminder  Resume insulin and review fill estimate on home screen  Infusion Sets:  Pt received the Varisoft infusion sets, but this is not what she wanted.   Gave patient Autosoft XC infusion sets   Type/Cannula length:  9 mm   Proper set selection, site placement and rotation  Change every 2-3 days as directed by HCP.   Delivering Boluses:  Standard food bolus adding multiple carbs; cancelling bolus;  correction bolus; food bolus with correction   Above/Below BG Target and IOB   Extended bolus feature; Quick bolus feature (optional)    My CGM:  Entering transmitter ID, entering sensor code, starting sensor session  2 hr. warm up period (no Basal-IQ or Control-IQ technology functionality during warm up), sensor calibration optional if sensor code entered  Alerts; CGM graph; trend arrows; connectivity between pump and mobile earl      Control-IQ Technology:   Turn Control-IQ technology on/off. Active CGM session and Personal Profile with Carbs on and Control-IQ settings are required.   Uses 30-minute predicted CGM values to adjust insulin deliver. Treatment values are not customizable.   Review Control-IQ technology flag and status icons to explain how Control-IQ technology works.   Activity: Exercise and sleep programming, use and icons to explain how Control-IQ technology works.   When Control-IQ is active, IOB reflects both bolus and basal insulin delivery.   Caution with manual injections or inhaled insulin while using Control-IQ    Safety Information:   Importance of backup plan and emergency supplies   Troubleshooting hypoglycemia and hyperglycemia   Precautions and Safety Tips       T:connect web application and mobile earl:     Pump serial number: [id number]    Training resources: t:slim X2 pump user guide, quick reference guides, t:simulator earl, e-learning modules, and online resources         Follow Up Plan     Follow up in about 6 weeks (around 9/17/2025) for Tandem review.    Today's care plan and follow up schedule was discussed with patient.  Leni verbalized understanding of the care plan, goals, and agrees to follow up plan.        The patient was encouraged to communicate with his/her health care provider/physician and care team regarding his/her condition(s) and treatment.  I provided the patient with my contact information today and encouraged to contact me via phone or [facility] Patient Portal as  needed.     Length of Visit   Total Time: 60 Minutes

## 2025-08-06 NOTE — Clinical Note
Xavier Dave, I met with Ms. Dumont and we made the following changes as discussed:  1. Decrease IC (1:2 grams to 1:1 grams) 2. Decrease ISF by 5 (1:14 mg/dL to 1:9 mg/dL) 3. Increase basal by 0.5 from 6a-12a (2.75 units/hr to 3.25 units/hr)  We also changed sleep mode to 12 am to 6 am.   I will check on her report Monday but she knows to reach out sooner if any issues.   Thanks! MAGALY Reynolds

## 2025-08-07 ENCOUNTER — TELEPHONE (OUTPATIENT)
Dept: DIABETES | Facility: CLINIC | Age: 35
End: 2025-08-07
Payer: COMMERCIAL

## 2025-08-07 NOTE — TELEPHONE ENCOUNTER
Called to discuss bolusing but no answer, left VM with call back number. Will send message through chart.

## 2025-09-04 ENCOUNTER — OFFICE VISIT (OUTPATIENT)
Dept: DIABETES | Facility: CLINIC | Age: 35
End: 2025-09-04
Payer: COMMERCIAL

## 2025-09-04 VITALS
OXYGEN SATURATION: 98 % | SYSTOLIC BLOOD PRESSURE: 130 MMHG | BODY MASS INDEX: 47.83 KG/M2 | HEART RATE: 88 BPM | DIASTOLIC BLOOD PRESSURE: 82 MMHG | WEIGHT: 278.69 LBS

## 2025-09-04 DIAGNOSIS — E66.813 CLASS 3 SEVERE OBESITY DUE TO EXCESS CALORIES WITH SERIOUS COMORBIDITY AND BODY MASS INDEX (BMI) OF 45.0 TO 49.9 IN ADULT: ICD-10-CM

## 2025-09-04 DIAGNOSIS — E11.65 UNCONTROLLED TYPE 2 DIABETES MELLITUS WITH HYPERGLYCEMIA, WITH LONG-TERM CURRENT USE OF INSULIN: Primary | ICD-10-CM

## 2025-09-04 DIAGNOSIS — Z79.4 UNCONTROLLED TYPE 2 DIABETES MELLITUS WITH HYPERGLYCEMIA, WITH LONG-TERM CURRENT USE OF INSULIN: Primary | ICD-10-CM

## 2025-09-04 LAB — GLUCOSE SERPL-MCNC: 136 MG/DL (ref 70–110)

## 2025-09-04 PROCEDURE — 82962 GLUCOSE BLOOD TEST: CPT | Mod: S$GLB,,, | Performed by: NURSE PRACTITIONER

## 2025-09-04 PROCEDURE — 99999 PR PBB SHADOW E&M-EST. PATIENT-LVL V: CPT | Mod: PBBFAC,,, | Performed by: NURSE PRACTITIONER

## 2025-09-04 RX ORDER — VALACYCLOVIR HYDROCHLORIDE 1 G/1
2000 TABLET, FILM COATED ORAL 2 TIMES DAILY
COMMUNITY
Start: 2025-07-30

## 2025-09-04 RX ORDER — SILVER SULFADIAZINE 10 G/1000G
CREAM TOPICAL
COMMUNITY

## 2025-09-04 RX ORDER — TRIAMCINOLONE ACETONIDE 1 MG/G
CREAM TOPICAL
COMMUNITY
Start: 2025-07-11 | End: 2026-07-11

## 2025-09-04 RX ORDER — MUPIROCIN 20 MG/G
OINTMENT TOPICAL 2 TIMES DAILY
COMMUNITY
Start: 2025-07-12

## 2025-09-04 RX ORDER — CICLOPIROX 1 G/100ML
SHAMPOO TOPICAL
COMMUNITY
Start: 2025-04-25